# Patient Record
Sex: MALE | Race: WHITE | NOT HISPANIC OR LATINO | Employment: UNEMPLOYED | URBAN - METROPOLITAN AREA
[De-identification: names, ages, dates, MRNs, and addresses within clinical notes are randomized per-mention and may not be internally consistent; named-entity substitution may affect disease eponyms.]

---

## 2018-08-26 ENCOUNTER — APPOINTMENT (OUTPATIENT)
Dept: RADIOLOGY | Facility: MEDICAL CENTER | Age: 38
End: 2018-08-26
Attending: EMERGENCY MEDICINE
Payer: MEDICAID

## 2018-08-26 ENCOUNTER — HOSPITAL ENCOUNTER (EMERGENCY)
Facility: MEDICAL CENTER | Age: 38
End: 2018-08-26
Attending: EMERGENCY MEDICINE
Payer: MEDICAID

## 2018-08-26 VITALS
WEIGHT: 155 LBS | OXYGEN SATURATION: 96 % | RESPIRATION RATE: 15 BRPM | SYSTOLIC BLOOD PRESSURE: 102 MMHG | BODY MASS INDEX: 20.99 KG/M2 | HEART RATE: 55 BPM | HEIGHT: 72 IN | DIASTOLIC BLOOD PRESSURE: 69 MMHG | TEMPERATURE: 96.5 F

## 2018-08-26 DIAGNOSIS — S61.412A LACERATION OF LEFT HAND WITHOUT FOREIGN BODY, INITIAL ENCOUNTER: ICD-10-CM

## 2018-08-26 PROCEDURE — 700101 HCHG RX REV CODE 250

## 2018-08-26 PROCEDURE — 73130 X-RAY EXAM OF HAND: CPT | Mod: LT

## 2018-08-26 PROCEDURE — 303747 HCHG EXTRA SUTURE

## 2018-08-26 PROCEDURE — A9270 NON-COVERED ITEM OR SERVICE: HCPCS | Performed by: EMERGENCY MEDICINE

## 2018-08-26 PROCEDURE — 304217 HCHG IRRIGATION SYSTEM

## 2018-08-26 PROCEDURE — 29125 APPL SHORT ARM SPLINT STATIC: CPT

## 2018-08-26 PROCEDURE — 99284 EMERGENCY DEPT VISIT MOD MDM: CPT

## 2018-08-26 PROCEDURE — 302874 HCHG BANDAGE ACE 2 OR 3""

## 2018-08-26 PROCEDURE — 304999 HCHG REPAIR-SIMPLE/INTERMED LEVEL 1

## 2018-08-26 PROCEDURE — 700102 HCHG RX REV CODE 250 W/ 637 OVERRIDE(OP): Performed by: EMERGENCY MEDICINE

## 2018-08-26 RX ORDER — LIDOCAINE HYDROCHLORIDE AND EPINEPHRINE BITARTRATE 20; .01 MG/ML; MG/ML
10 INJECTION, SOLUTION SUBCUTANEOUS ONCE
Status: COMPLETED | OUTPATIENT
Start: 2018-08-26 | End: 2018-08-26

## 2018-08-26 RX ORDER — HYDROCODONE BITARTRATE AND ACETAMINOPHEN 5; 325 MG/1; MG/1
1 TABLET ORAL ONCE
Status: COMPLETED | OUTPATIENT
Start: 2018-08-26 | End: 2018-08-26

## 2018-08-26 RX ORDER — CEPHALEXIN 500 MG/1
500 CAPSULE ORAL 3 TIMES DAILY
Qty: 30 CAP | Refills: 0 | Status: SHIPPED | OUTPATIENT
Start: 2018-08-26 | End: 2018-09-05

## 2018-08-26 RX ORDER — HYDROCODONE BITARTRATE AND ACETAMINOPHEN 5; 325 MG/1; MG/1
1-2 TABLET ORAL EVERY 4 HOURS PRN
Qty: 8 TAB | Refills: 0 | Status: SHIPPED | OUTPATIENT
Start: 2018-08-26 | End: 2018-08-28

## 2018-08-26 RX ORDER — LIDOCAINE HYDROCHLORIDE AND EPINEPHRINE BITARTRATE 20; .01 MG/ML; MG/ML
INJECTION, SOLUTION SUBCUTANEOUS
Status: COMPLETED
Start: 2018-08-26 | End: 2018-08-26

## 2018-08-26 RX ORDER — CEPHALEXIN 500 MG/1
500 CAPSULE ORAL ONCE
Status: COMPLETED | OUTPATIENT
Start: 2018-08-26 | End: 2018-08-26

## 2018-08-26 RX ADMIN — HYDROCODONE BITARTRATE AND ACETAMINOPHEN 1 TABLET: 5; 325 TABLET ORAL at 20:04

## 2018-08-26 RX ADMIN — LIDOCAINE HYDROCHLORIDE AND EPINEPHRINE BITARTRATE 10 ML: 20; .01 INJECTION, SOLUTION SUBCUTANEOUS at 20:00

## 2018-08-26 RX ADMIN — LIDOCAINE HYDROCHLORIDE AND EPINEPHRINE 10 ML: 20; 10 INJECTION, SOLUTION INFILTRATION; PERINEURAL at 20:00

## 2018-08-26 RX ADMIN — CEPHALEXIN 500 MG: 500 CAPSULE ORAL at 21:07

## 2018-08-26 ASSESSMENT — LIFESTYLE VARIABLES
TOTAL SCORE: 0
HAVE PEOPLE ANNOYED YOU BY CRITICIZING YOUR DRINKING: NO
TOTAL SCORE: 0
TOTAL SCORE: 0
EVER FELT BAD OR GUILTY ABOUT YOUR DRINKING: NO
CONSUMPTION TOTAL: INCOMPLETE
DO YOU DRINK ALCOHOL: YES
HAVE YOU EVER FELT YOU SHOULD CUT DOWN ON YOUR DRINKING: NO
EVER HAD A DRINK FIRST THING IN THE MORNING TO STEADY YOUR NERVES TO GET RID OF A HANGOVER: NO

## 2018-08-26 ASSESSMENT — PAIN SCALES - GENERAL
PAINLEVEL_OUTOF10: 10
PAINLEVEL_OUTOF10: 5

## 2018-08-27 NOTE — ED NOTES
Discharge instructions given to patient, prescriptions provided, a verbal understanding of all instructions was stated. Pt preferred to walk out. VSS,  all belongings accounted for.

## 2018-08-27 NOTE — ED PROVIDER NOTES
ED Provider Note    Scribed for Rosa Stein M.D. by Piero Koehler. 8/26/2018, 7:46 PM.    Primary Care Provider: None noted   Means of arrival: Walk-In  History obtained from: Patient  History limited by: None     CHIEF COMPLAINT  Chief Complaint   Patient presents with   • T-5000 Lacerations     left hand with .        HPI  Narciso Wilson is a 38 y.o. male who presents to the Emergency Department for evaluation of left hand laceration that occurred just prior to arrival. Patient states he cut his left hand with a  while working on a project. Per nurse's notes, the laceration is located between his first and second left digits. Patient denies any other injuries. His last tetanus shot was approximately 14 months ago.        REVIEW OF SYSTEMS  Pertinent positives include left hand laceration and pain. Pertinent negatives include no other injuries.  E.     PAST MEDICAL HISTORY  None noted     SOCIAL HISTORY  Social History   Substance Use Topics   • Smoking status: Current Every Day Smoker     Packs/day: 0.50   • Smokeless tobacco: Never Used   • Alcohol use No      History   Drug Use No       SURGICAL HISTORY  patient denies any surgical history     CURRENT MEDICATIONS  Home Medications     Reviewed by Luis Bonds R.N. (Registered Nurse) on 08/26/18 at 1903  Med List Status: Complete   Medication Last Dose Status        Patient Femi Taking any Medications                       ALLERGIES  No Known Allergies    PHYSICAL EXAM  VITAL SIGNS: /43   Pulse 61   Temp 35.8 °C (96.5 °F)   Resp 18   Ht 1.829 m (6')   Wt 70.3 kg (155 lb)   SpO2 99%   BMI 21.02 kg/m²   Constitutional: Alert in no apparent distress. Well appearing  HENT: Normocephalic, Atraumatic, Bilateral external ears normal. Nose normal.   Eyes:  Conjunctiva normal, non-icteric.   Lungs: Non-labored respirations  Skin: Warm, Dry, No erythema, No rash.   Neurologic: Alert, Grossly non-focal.   Psychiatric: Affect  normal, Judgment normal, Mood normal, Appears appropriate and not intoxicated.   Extremities: Large 5 cm laceration over the first webspace on dorsal aspect of his left hand.       RADIOLOGY  DX-HAND 3+ LEFT   Final Result         1.  No acute traumatic bony injury.      The radiologist's interpretation of all radiological studies have been reviewed by me.    Laceration Repair Procedure Note    Indication: Laceration    Procedure: The patient was placed in the appropriate position and anesthesia around the laceration was obtained by infiltration using 2% Lidocaine with epinephrine. The area was then irrigated with sterile water. The wound was explored and no foreign body/bodies was/were found. The laceration was closed with 4-0 Prolene using interrupted sutures. There were no additional lacerations requiring repair. The wound area was then dressed with a bandage.      Total repaired wound length: 5 cm.     Other Items: Suture count: 11    The patient tolerated the procedure well.    Complications: None      COURSE & MEDICAL DECISION MAKING  Pertinent Labs & Imaging studies reviewed. (See chart for details)    7:46 PM - Patient seen and examined at bedside. Patient will receive Norco 5-325 mg 1 tablet. Ordered DX-Hand to evaluate.     7:50 PM - Cleansed laceration at this time.     8:11 PM - Paged Hand Plastic Surgery     8:16 PM - Consulted Dr. Loera, Hand Plastic Surgeon, who agrees with having the patient's laceration repaired.     8:20 PM - Reviewed patient's radiology results at this time.      8:27 PM - Laceration repair procedure performed at this time. Discussed radiology results as shown above. Informed patient of discharge and advised to return in 10 days for suture removal. He is also advised to return to ED for increased redness, swelling, and drainage or other concerns. Patient understands and is agreeable at this time.       Decision Making:  This is a 38 y.o. year old who presents with a large  laceration on his hand.  It does involve the muscle but he has intact opposition and sensation of his fingers as well as extension.  I do not think he involve any tendons.  X-rays negative for fracture.  He did speak briefly with Dr. Loera who advised closure.  This was performed.  He was given a very short prescription of pain medications but otherwise will take ibuprofen and Tylenol.  He was given Keflex as well he is agreeable to this plan his tetanus is up-to-date.    The patient will not drink alcohol nor drive with prescribed medications. The patient will return for new or worsening symptoms and is stable at the time of discharge. Patient was given return precautions. Patient and/or family member verbalizes understanding and will comply.     In prescribing controlled substances to this patient, I certify that I have obtained and reviewed the medical history of Narciso Wilson. I have also made a good carlos effort to obtain applicable records from other providers who have treated the patient and records did not demonstrate any increased risk of substance abuse that would prevent me from prescribing controlled substances.     I have conducted a physical exam and documented it. I have reviewed Mr. Wilson’s prescription history as maintained by the [place] Prescription Monitoring Program.     I have assessed the patient’s risk for abuse, dependency, and addiction using the validated Opioid Risk Tool available at https://www.mdcalc.com/xeguvj-phye-kcew-ort-narcotic-abuse.     Given the above, I believe the benefits of controlled substance therapy outweigh the risks. The reasons for prescribing controlled substances include non-narcotic, oral analgesic alternatives have been inadequate for pain control. Accordingly, I have discussed the risk and benefits, treatment plan, and alternative therapies with the patient.       DISPOSITION:  Patient will be discharged home in stable condition.    FOLLOW UP:  [facility], Emergency Dept  [address] [id number] [phone]    Return in 10 days for suture removal.  Return before then for increased redness swelling drainage or other concerns.    Eduard Lorea M.D.  [address]  [place] [facility] [address]  [phone]      as needed for hand follow up      OUTPATIENT MEDICATIONS:  Discharge Medication List as of 8/26/2018  8:56 PM      START taking these medications    Details   HYDROcodone-acetaminophen (NORCO) 5-325 MG Tab per tablet Take 1-2 Tabs by mouth every four hours as needed for up to 2 days., Disp-8 Tab, R-0, Print Rx Paper, For 2 days      cephALEXin (KEFLEX) 500 MG Cap Take 1 Cap by mouth 3 times a day for 10 days., Disp-30 Cap, R-0, Print Rx Paper             FINAL IMPRESSION  1. Laceration of left hand without foreign body, initial encounter      This dictation has been created using voice recognition software and/or scribes. The accuracy of the dictation is limited by the abilities of the software and the expertise of the scribes. I expect there may be some errors of grammar and possibly content. I made every attempt to manually correct the errors within my dictation. However, errors related to voice recognition software and/or scribes may still exist and should be interpreted within the appropriate context.     IPiero (Scribe), am scribing for, and in the presence of, Rosa Stein M.D..    Electronically signed by: Piero Koehler (Scribe), 8/26/2018    Rosa ALY M.D. personally performed the services described in this documentation, as scribed by Piero Koehler in my presence, and it is both accurate and complete.    The note accurately reflects work and decisions made by me.  Rosa Stein  8/26/2018  9:59 PM

## 2018-08-27 NOTE — ED NOTES
Dr. Stein in to numb and irrigate pt's laceration. Pt. Medicated per MAR and updated on POC for X-Ray and sutures. Call light within reach. Will continue to monitor.

## 2018-08-27 NOTE — ED TRIAGE NOTES
Chief Complaint   Patient presents with   • T-5000 Lacerations     left hand with .      To triage for above. Appears uncomfortable. CMS intact. VSS. Explained triage process, to waiting room. Asked to inform RN if questions or concerns arise.

## 2018-08-27 NOTE — ED NOTES
Pt. To [facility] [address] via wheelchair. Pt. States he cute the area between his first and second left digits using a  working on a project. Pt denies HI/SI. Pulses palpable in left arm and CMS intact. Pt. Updated on POC for ERP to see. Call light within reach. Will continue to monitor.

## 2018-08-27 NOTE — DISCHARGE INSTRUCTIONS
Laceration Care, Adult  A laceration is a cut that goes through all of the layers of the skin and into the tissue that is right under the skin. Some lacerations heal on their own. Others need to be closed with stitches (sutures), staples, skin adhesive strips, or skin glue. Proper laceration care minimizes the risk of infection and helps the laceration to heal better.  HOW TO CARE FOR YOUR LACERATION  If sutures or staples were used:  · Keep the wound clean and dry.  · If you were given a bandage (dressing), you should change it at least one time per day or as told by your health care provider. You should also change it if it becomes wet or dirty.  · Keep the wound completely dry for the first 24 hours or as told by your health care provider. After that time, you may shower or bathe. However, make sure that the wound is not soaked in water until after the sutures or staples have been removed.  · Clean the wound one time each day or as told by your health care provider:  ¨ Wash the wound with soap and water.  ¨ Rinse the wound with water to remove all soap.  ¨ Pat the wound dry with a clean towel. Do not rub the wound.  · After cleaning the wound, apply a thin layer of antibiotic ointment as told by your health care provider. This will help to prevent infection and keep the dressing from sticking to the wound.  · Have the sutures or staples removed as told by your health care provider.  If skin adhesive strips were used:  · Keep the wound clean and dry.  · If you were given a bandage (dressing), you should change it at least one time per day or as told by your health care provider. You should also change it if it becomes dirty or wet.  · Do not get the skin adhesive strips wet. You may shower or bathe, but be careful to keep the wound dry.  · If the wound gets wet, pat it dry with a clean towel. Do not rub the wound.  · Skin adhesive strips fall off on their own. You may trim the strips as the wound heals. Do not  remove skin adhesive strips that are still stuck to the wound. They will fall off in time.  If skin glue was used:  · Try to keep the wound dry, but you may briefly wet it in the shower or bath. Do not soak the wound in water, such as by swimming.  · After you have showered or bathed, gently pat the wound dry with a clean towel. Do not rub the wound.  · Do not do any activities that will make you sweat heavily until the skin glue has fallen off on its own.  · Do not apply liquid, cream, or ointment medicine to the wound while the skin glue is in place. Using those may loosen the film before the wound has healed.  · If you were given a bandage (dressing), you should change it at least one time per day or as told by your health care provider. You should also change it if it becomes dirty or wet.  · If a dressing is placed over the wound, be careful not to apply tape directly over the skin glue. Doing that may cause the glue to be pulled off before the wound has healed.  · Do not pick at the glue. The skin glue usually remains in place for 5-10 days, then it falls off of the skin.  General Instructions  · Take over-the-counter and prescription medicines only as told by your health care provider.  · If you were prescribed an antibiotic medicine or ointment, take or apply it as told by your doctor. Do not stop using it even if your condition improves.  · To help prevent scarring, make sure to cover your wound with sunscreen whenever you are outside after stitches are removed, after adhesive strips are removed, or when glue remains in place and the wound is healed. Make sure to wear a sunscreen of at least 30 SPF.  · Do not scratch or pick at the wound.  · Keep all follow-up visits as told by your health care provider. This is important.  · Check your wound every day for signs of infection. Watch for:  ¨ Redness, swelling, or pain.  ¨ Fluid, blood, or pus.  · Raise (elevate) the injured area above the level of your heart  while you are sitting or lying down, if possible.  SEEK MEDICAL CARE IF:  · You received a tetanus shot and you have swelling, severe pain, redness, or bleeding at the injection site.  · You have a fever.  · A wound that was closed breaks open.  · You notice a bad smell coming from your wound or your dressing.  · You notice something coming out of the wound, such as wood or glass.  · Your pain is not controlled with medicine.  · You have increased redness, swelling, or pain at the site of your wound.  · You have fluid, blood, or pus coming from your wound.  · You notice a change in the color of your skin near your wound.  · You need to change the dressing frequently due to fluid, blood, or pus draining from the wound.  · You develop a new rash.  · You develop numbness around the wound.  SEEK IMMEDIATE MEDICAL CARE IF:  · You develop severe swelling around the wound.  · Your pain suddenly increases and is severe.  · You develop painful lumps near the wound or on skin that is anywhere on your body.  · You have a red streak going away from your wound.  · The wound is on your hand or foot and you cannot properly move a finger or toe.  · The wound is on your hand or foot and you notice that your fingers or toes look pale or bluish.     This information is not intended to replace advice given to you by your health care provider. Make sure you discuss any questions you have with your health care provider.     Document Released: 12/18/2006 Document Revised: 05/03/2016 Document Reviewed: 12/14/2015  [employer] Interactive Patient Education ©2016 [employer]

## 2018-11-09 ENCOUNTER — HOSPITAL ENCOUNTER (EMERGENCY)
Facility: MEDICAL CENTER | Age: 38
End: 2018-11-09
Attending: EMERGENCY MEDICINE
Payer: MEDICAID

## 2018-11-09 VITALS
OXYGEN SATURATION: 97 % | BODY MASS INDEX: 20.87 KG/M2 | WEIGHT: 154.1 LBS | HEIGHT: 72 IN | SYSTOLIC BLOOD PRESSURE: 110 MMHG | TEMPERATURE: 97.7 F | DIASTOLIC BLOOD PRESSURE: 65 MMHG | RESPIRATION RATE: 16 BRPM | HEART RATE: 82 BPM

## 2018-11-09 DIAGNOSIS — L08.9 WOUND INFECTION: ICD-10-CM

## 2018-11-09 DIAGNOSIS — J06.9 VIRAL URI: ICD-10-CM

## 2018-11-09 DIAGNOSIS — T14.8XXA WOUND INFECTION: ICD-10-CM

## 2018-11-09 PROCEDURE — 99283 EMERGENCY DEPT VISIT LOW MDM: CPT

## 2018-11-09 RX ORDER — CEPHALEXIN 500 MG/1
1000 CAPSULE ORAL 3 TIMES DAILY
Qty: 42 CAP | Refills: 0 | Status: SHIPPED | OUTPATIENT
Start: 2018-11-09 | End: 2018-11-16

## 2018-11-09 NOTE — ED NOTES
Pt ambulate to room, agree with triage notes. Pt states on and off sore throat with nasal discharge and general tiredness today. Also C/O hand pain from previous injury.

## 2018-11-09 NOTE — ED NOTES
Discharge instructions given to patient, prescriptions provided, a verbal understanding of all instructions was stated. Pt preferred to walk out accompanied by self VSS,  all belongings accounted for.

## 2018-11-09 NOTE — ED TRIAGE NOTES
Narciso Wilson  Chief Complaint   Patient presents with   • Hand Pain     Pt complain of pain in his left hand. Pt cut himself 2-3 months ago with a box knife. Pt now complains of extreme pain with cold temperature, that is worse tonight. CMS intact.    • Cold Symptoms     x 3 days. Pt reports taking OTC medications for symptom relief.      Pt ambulated to triage with above complaint.     /65   Pulse 82   Temp 36.5 °C (97.7 °F)   Resp 16   Ht 1.829 m (6')   Wt 69.9 kg (154 lb 1.6 oz)   SpO2 97%   BMI 20.90 kg/m²     Pt informed of triage process and encouraged to notify staff of any changes or concerns. Pt verbalized understanding of instructions. Apologized for long wait time. Pt placed back in lobby.

## 2018-11-09 NOTE — DISCHARGE INSTRUCTIONS
You were seen in the Emergency Department for hand pain and cold symptoms.    Please use 1,000mg of tylenol or 600mg of ibuprofen every 6 hours as needed for pain.  Take antibiotics as directed for possible wound infection.  Please ensure you are drinking plenty of fluids.    Please follow up with your primary care physician.    Return to the Emergency Department with fevers, worsening pain, redness or swelling surrounding the wound, or other concerns.

## 2018-11-09 NOTE — ED PROVIDER NOTES
ED Provider Note    Scribed for Nivia Lane M.D. by Mindi García. 11/9/2018  1:25 AM    Means of arrival: Walk in  History obtained from: Patient  History limited by: None      CHIEF COMPLAINT  Chief Complaint   Patient presents with   • Hand Pain     Pt complain of pain in his left hand. Pt cut himself 2-3 months ago with a box knife. Pt now complains of extreme pain with cold temperature, that is worse tonight. CMS intact.    • Cold Symptoms     x 3 days. Pt reports taking OTC medications for symptom relief.        HPI  Narciso Wilson is a 38 y.o. male who presents to the Emergency Department for evaluation of left hand pain. He states he initially cut the webspace between his left thumb and left pointer finger about a month ago, but has since noticed decreased sensation around his injury site. The patient additionally endorses redness around his prior laceration site that began 1 week ago. He states he was never placed on antibiotics following the incident. The patient denies associated weakness.     The patient additionally endorses cold like symptoms that include tactile fever, sinus congestion, generalized fatigue, and headaches. He also reports a non productive cough, but states he is in the process of quitting smoking. The patient denies nausea or vomiting. No alleviating or exacerbating factors are identified at this time.     REVIEW OF SYSTEMS  Pertinent positive include decreased sensation to left hand, left hand pain, redness of left hand, tactile fever, sinus congestion, generalized fatigue, headache, and cough. Pertinent negative include nausea, vomiting, or weakness.     PAST MEDICAL HISTORY   Left Hand Laceration    SOCIAL HISTORY  Social History     Social History Main Topics   • Smoking status: Current Every Day Smoker     Packs/day: 0.50   • Smokeless tobacco: Never Used   • Alcohol use No   • Drug use: No       SURGICAL HISTORY  patient denies any surgical history    CURRENT  MEDICATIONS  Home Medications    **Home medications have not yet been reviewed for this encounter**         ALLERGIES  No Known Allergies    PHYSICAL EXAM   VITAL SIGNS: /65   Pulse 82   Temp 36.5 °C (97.7 °F)   Resp 16   Ht 1.829 m (6')   Wt 69.9 kg (154 lb 1.6 oz)   SpO2 97%   BMI 20.90 kg/m²    Constitutional: Non-toxic appearing male. Alert in no apparent distress.  HENT: Normocephalic, Atraumatic. Bilateral external ears normal. Nose normal. Moist mucous membranes.  Neck: Supple, full range of motion.  Eyes: Pupils are equal and reactive. Conjunctiva normal.   Heart: Regular rate and rhythm. No murmurs.    Lungs: No respiratory distress.  Normal work of breathing.  Clear to auscultation bilaterally.  Abdomen:  Soft, no distention. No tenderness to palpation  Skin: Warm, Dry. No rash. Left hand with 3-4 cm healing laceration at the base of the thumb with surrounding erythema.   Musculoskeletal: Atraumatic, no deformities noted. Intact distal pulses.  Neurologic: Alert and oriented. Moving all extremities spontaneously. Decreased sensation distally to healing left hand laceration. Sensation intact throughout radial, medial, and ulnar distributions.    Psychiatric: Affect normal, Mood normal. Appears appropriate and not intoxicated.       ED COURSE  Vitals:    11/09/18 0110 11/09/18 0114   BP: 110/65    Pulse: 82    Resp: 16    Temp: 36.5 °C (97.7 °F)    SpO2: 97%    Weight:  69.9 kg (154 lb 1.6 oz)   Height: 1.829 m (6')          Medications administered:  Medications - No data to display    MEDICAL DECISION MAKING  1:25 AM Patient seen and examined at bedside. The patient presents with left hand pain and URI symptoms. Vital signs and physical exam are reassuring. Patient's lungs are clear with no signs of pneumonia and throat is clear with no signs of strep pharyngitis. He was made aware his cold like symptoms are likely due to viral illness. Patient additionally has sustained superficial nerve  damage from recent laceration.  There is no evidence of weakness or concern for tendon damage.  He may be developing minor wound infection therefore he will be given Keflex prescription for surrounding erythema around healing laceration. Patient instructed to return for worsening symptoms. He is understanding and agreeable to discharge.     The patient will return for new or worsening symptoms and is stable at the time of discharge.      DISPOSITION:  Patient will be discharged home in stable condition.    FOLLOW UP:  [facility]  [address] [address] [phone]  Call   to establish PCP    [facility], Emergency Dept  [address] [address] [phone]    If symptoms worsen      OUTPATIENT MEDICATIONS:  Discharge Medication List as of 11/9/2018  1:36 AM      START taking these medications    Details   cephALEXin (KEFLEX) 500 MG Cap Take 2 Caps by mouth 3 times a day for 7 days., Disp-42 Cap, R-0, Print Rx Paper             IMPRESSION  (T14.8XXA,  L08.9) Wound infection  (J06.9) Viral URI    Results, diagnoses, and treatment options were discussed with the patient and/or family. Patient verbalized understanding of plan of care.       Mindi ALY (Tea), am scribing for, and in the presence of, Nivia Lane M.D..    Electronically signed by: Minid García (Tea), 11/9/2018    Nivia ALY M.D. personally performed the services described in this documentation, as scribed by Mindi García in my presence, and it is both accurate and complete.    E.    The note accurately reflects work and decisions made by me.  Nivia Lane  11/9/2018  8:20 AM

## 2020-01-23 ENCOUNTER — HOSPITAL ENCOUNTER (EMERGENCY)
Facility: MEDICAL CENTER | Age: 40
End: 2020-01-23
Attending: EMERGENCY MEDICINE
Payer: MEDICAID

## 2020-01-23 VITALS
TEMPERATURE: 97.5 F | WEIGHT: 171.3 LBS | HEART RATE: 76 BPM | OXYGEN SATURATION: 98 % | DIASTOLIC BLOOD PRESSURE: 71 MMHG | HEIGHT: 72 IN | RESPIRATION RATE: 17 BRPM | SYSTOLIC BLOOD PRESSURE: 105 MMHG | BODY MASS INDEX: 23.2 KG/M2

## 2020-01-23 DIAGNOSIS — L02.91 ABSCESS: ICD-10-CM

## 2020-01-23 LAB
ALBUMIN SERPL BCP-MCNC: 3.8 G/DL (ref 3.2–4.9)
ALBUMIN/GLOB SERPL: 1 G/DL
ALP SERPL-CCNC: 73 U/L (ref 30–99)
ALT SERPL-CCNC: 27 U/L (ref 2–50)
AMPHET UR QL SCN: POSITIVE
ANION GAP SERPL CALC-SCNC: 7 MMOL/L (ref 0–11.9)
AST SERPL-CCNC: 26 U/L (ref 12–45)
BARBITURATES UR QL SCN: NEGATIVE
BASOPHILS # BLD AUTO: 0.7 % (ref 0–1.8)
BASOPHILS # BLD: 0.05 K/UL (ref 0–0.12)
BENZODIAZ UR QL SCN: NEGATIVE
BILIRUB SERPL-MCNC: 0.5 MG/DL (ref 0.1–1.5)
BUN SERPL-MCNC: 17 MG/DL (ref 8–22)
BZE UR QL SCN: NEGATIVE
CALCIUM SERPL-MCNC: 9.3 MG/DL (ref 8.5–10.5)
CANNABINOIDS UR QL SCN: POSITIVE
CHLORIDE SERPL-SCNC: 103 MMOL/L (ref 96–112)
CO2 SERPL-SCNC: 27 MMOL/L (ref 20–33)
CREAT SERPL-MCNC: 0.83 MG/DL (ref 0.5–1.4)
CRP SERPL HS-MCNC: 34.3 MG/L (ref 0–7.5)
EOSINOPHIL # BLD AUTO: 0.15 K/UL (ref 0–0.51)
EOSINOPHIL NFR BLD: 2 % (ref 0–6.9)
ERYTHROCYTE [DISTWIDTH] IN BLOOD BY AUTOMATED COUNT: 42 FL (ref 35.9–50)
GLOBULIN SER CALC-MCNC: 3.9 G/DL (ref 1.9–3.5)
GLUCOSE SERPL-MCNC: 97 MG/DL (ref 65–99)
GRAM STN SPEC: NORMAL
HCT VFR BLD AUTO: 46.2 % (ref 42–52)
HGB BLD-MCNC: 14.8 G/DL (ref 14–18)
IMM GRANULOCYTES # BLD AUTO: 0.06 K/UL (ref 0–0.11)
IMM GRANULOCYTES NFR BLD AUTO: 0.8 % (ref 0–0.9)
LYMPHOCYTES # BLD AUTO: 0.8 K/UL (ref 1–4.8)
LYMPHOCYTES NFR BLD: 10.5 % (ref 22–41)
MCH RBC QN AUTO: 27.2 PG (ref 27–33)
MCHC RBC AUTO-ENTMCNC: 32 G/DL (ref 33.7–35.3)
MCV RBC AUTO: 84.9 FL (ref 81.4–97.8)
METHADONE UR QL SCN: NEGATIVE
MONOCYTES # BLD AUTO: 0.55 K/UL (ref 0–0.85)
MONOCYTES NFR BLD AUTO: 7.2 % (ref 0–13.4)
NEUTROPHILS # BLD AUTO: 6.03 K/UL (ref 1.82–7.42)
NEUTROPHILS NFR BLD: 78.8 % (ref 44–72)
NRBC # BLD AUTO: 0 K/UL
NRBC BLD-RTO: 0 /100 WBC
OPIATES UR QL SCN: NEGATIVE
OXYCODONE UR QL SCN: NEGATIVE
PCP UR QL SCN: NEGATIVE
PLATELET # BLD AUTO: 233 K/UL (ref 164–446)
PMV BLD AUTO: 9.9 FL (ref 9–12.9)
POTASSIUM SERPL-SCNC: 4 MMOL/L (ref 3.6–5.5)
PROPOXYPH UR QL SCN: NEGATIVE
PROT SERPL-MCNC: 7.7 G/DL (ref 6–8.2)
RBC # BLD AUTO: 5.44 M/UL (ref 4.7–6.1)
SIGNIFICANT IND 70042: NORMAL
SITE SITE: NORMAL
SODIUM SERPL-SCNC: 137 MMOL/L (ref 135–145)
SOURCE SOURCE: NORMAL
WBC # BLD AUTO: 7.6 K/UL (ref 4.8–10.8)

## 2020-01-23 PROCEDURE — 303977 HCHG I & D

## 2020-01-23 PROCEDURE — 80307 DRUG TEST PRSMV CHEM ANLYZR: CPT

## 2020-01-23 PROCEDURE — 10160 PNXR ASPIR ABSC HMTMA BULLA: CPT

## 2020-01-23 PROCEDURE — 87070 CULTURE OTHR SPECIMN AEROBIC: CPT | Mod: XU

## 2020-01-23 PROCEDURE — 80053 COMPREHEN METABOLIC PANEL: CPT

## 2020-01-23 PROCEDURE — 96365 THER/PROPH/DIAG IV INF INIT: CPT | Mod: XU

## 2020-01-23 PROCEDURE — 86141 C-REACTIVE PROTEIN HS: CPT

## 2020-01-23 PROCEDURE — 99283 EMERGENCY DEPT VISIT LOW MDM: CPT

## 2020-01-23 PROCEDURE — 303485 HCHG DRESSING MEDIUM

## 2020-01-23 PROCEDURE — 87147 CULTURE TYPE IMMUNOLOGIC: CPT

## 2020-01-23 PROCEDURE — 85025 COMPLETE CBC W/AUTO DIFF WBC: CPT

## 2020-01-23 PROCEDURE — 87040 BLOOD CULTURE FOR BACTERIA: CPT

## 2020-01-23 PROCEDURE — 87205 SMEAR GRAM STAIN: CPT

## 2020-01-23 PROCEDURE — 99284 EMERGENCY DEPT VISIT MOD MDM: CPT

## 2020-01-23 PROCEDURE — 87186 SC STD MICRODIL/AGAR DIL: CPT

## 2020-01-23 PROCEDURE — 700101 HCHG RX REV CODE 250: Performed by: EMERGENCY MEDICINE

## 2020-01-23 PROCEDURE — 87077 CULTURE AEROBIC IDENTIFY: CPT

## 2020-01-23 RX ORDER — CLINDAMYCIN HYDROCHLORIDE 150 MG/1
300 CAPSULE ORAL 4 TIMES DAILY
Qty: 56 CAP | Refills: 0 | Status: SHIPPED | OUTPATIENT
Start: 2020-01-23 | End: 2020-01-30

## 2020-01-23 RX ORDER — LIDOCAINE HYDROCHLORIDE AND EPINEPHRINE BITARTRATE 20; .01 MG/ML; MG/ML
10 INJECTION, SOLUTION SUBCUTANEOUS ONCE
Status: COMPLETED | OUTPATIENT
Start: 2020-01-23 | End: 2020-01-23

## 2020-01-23 RX ORDER — CLINDAMYCIN PHOSPHATE 600 MG/50ML
600 INJECTION, SOLUTION INTRAVENOUS ONCE
Status: COMPLETED | OUTPATIENT
Start: 2020-01-23 | End: 2020-01-23

## 2020-01-23 RX ADMIN — CLINDAMYCIN IN 5 PERCENT DEXTROSE 600 MG: 12 INJECTION, SOLUTION INTRAVENOUS at 12:05

## 2020-01-23 RX ADMIN — LIDOCAINE HYDROCHLORIDE AND EPINEPHRINE 10 ML: 20; 10 INJECTION, SOLUTION INFILTRATION; PERINEURAL at 11:45

## 2020-01-23 NOTE — ED TRIAGE NOTES
Pt believes he has a spider bite to his right forearm, denies seeing a spider. Large redness and abscess like area noted.

## 2020-01-23 NOTE — ED NOTES
Patient states he is unable to provide urine sample, urinal placed at bedside and encouraged to void.
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
## 2020-01-25 LAB
BACTERIA WND AEROBE CULT: ABNORMAL
BACTERIA WND AEROBE CULT: ABNORMAL
GRAM STN SPEC: ABNORMAL
SIGNIFICANT IND 70042: ABNORMAL
SITE SITE: ABNORMAL
SOURCE SOURCE: ABNORMAL

## 2020-01-27 NOTE — ED NOTES
ED Positive Culture Follow-up/Notification Note:    Date: 1/26/20     Patient seen in the ED on 1/23/2020 for right forearm swelling x 2 days. The pt reported that he has felt ill and may have been having fevers. The pt denied any chills, vomiting, or IVDU, however his UA was positive for amphetamines.     1. Abscess       Discharge Medication List as of 1/23/2020  2:07 PM      START taking these medications    Details   clindamycin (CLEOCIN) 150 MG Cap Take 2 Caps by mouth 4 times a day for 7 days., Disp-56 Cap, R-0, Print Rx Paper           Medications given in the ED:  -Clindamycin 600mg IV once    Allergies: Patient has no known allergies.     Vitals:    01/23/20 1059 01/23/20 1100 01/23/20 1418   BP: 115/70  105/71   Pulse: 90  76   Resp: 18  17   Temp: 36.4 °C (97.5 °F)     TempSrc: Temporal     SpO2: 99%  98%   Weight:  77.7 kg (171 lb 4.8 oz)    Height: 1.829 m (6')         Final cultures:   Results     Procedure Component Value Units Date/Time    CULTURE WOUND W/ GRAM STAIN [[id number]]  (Abnormal)  (Susceptibility) Collected:  01/23/20 1328    Order Status:  Completed Specimen:  Wound from Abscess Updated:  01/25/20 0818     Significant Indicator POS     Source WND     Site Right Arm     Culture Result -     Gram Stain Result Few WBCs.  Few Gram positive cocci.       Culture Result Staphylococcus aureus  Heavy growth      Susceptibility     Staphylococcus aureus (1)     Antibiotic Interpretation Microscan Method Status    Azithromycin Sensitive <=2 mcg/mL BROOKE Final    Clindamycin Sensitive <=0.5 mcg/mL BROOKE Final    Cefazolin Sensitive <=8 mcg/mL BROOKE Final    Ceftaroline Sensitive <=0.5 mcg/mL BROOKE Final    Daptomycin Sensitive <=1 mcg/mL BROOKE Final    Ampicillin/sulbactam Sensitive <=8/4 mcg/mL BROOKE Final    Erythromycin Sensitive <=0.25 mcg/mL BROOKE Final    Vancomycin Sensitive 1 mcg/mL BROOKE Final    Oxacillin Sensitive <=0.25 mcg/mL BROOKE Final    Pip/Tazobactam Sensitive <=4 mcg/mL BROOKE Final    Trimeth/Sulfa  "Sensitive <=0.5/9.5 mcg/mL BROOKE Final    Tetracycline Sensitive <=4 mcg/mL BROOKE Final                   Blood Culture [[id number]] Collected:  01/23/20 1130    Order Status:  Completed Specimen:  Blood from Peripheral Updated:  01/24/20 0924     Significant Indicator NEG     Source BLD     Site PERIPHERAL     Culture Result No Growth  Note: Blood cultures are incubated for 5 days and  are monitored continuously.Positive blood cultures  are called to the RN and reported as soon as  they are identified.      Narrative:       Per Hospital Policy: Only change Specimen Src: to \"Line\" if  specified by physician order.  No site indicated    GRAM STAIN [[id number]] Collected:  01/23/20 1328    Order Status:  Completed Specimen:  Wound Updated:  01/23/20 2124     Significant Indicator .     Source WND     Site Right Arm     Gram Stain Result Few WBCs.  Few Gram positive cocci.            Plan:   Appropriate antibiotic therapy prescribed. No changes required based upon culture result.    The pt underwent I&D while in the ED. Per ERP's note, pt was counseled to return if culture was positive, and to return in 2 days for a wound check and packing change. No record of pt returning in EMR. I attempted to call the pt, inform him of results, and encourage him to return to the ED as instructed by ERP at ED visit. No answer. I left a generic VM asking the pt to call back the ED culture line at [phone].     If the pt calls back, I recommend encouraging compliance with clindamycin and to return to the ED for wound check and repacking, especially if he reports that his symptoms/wound have worsened, or if he has been experiencing fevers.       Sanket Aragon, PharmD    "

## 2020-01-28 LAB
BACTERIA BLD CULT: NORMAL
SIGNIFICANT IND 70042: NORMAL
SITE SITE: NORMAL
SOURCE SOURCE: NORMAL

## 2020-02-13 ENCOUNTER — HOSPITAL ENCOUNTER (EMERGENCY)
Facility: MEDICAL CENTER | Age: 40
End: 2020-02-13
Attending: EMERGENCY MEDICINE
Payer: MEDICAID

## 2020-02-13 VITALS
DIASTOLIC BLOOD PRESSURE: 70 MMHG | BODY MASS INDEX: 22.9 KG/M2 | TEMPERATURE: 97.9 F | HEART RATE: 94 BPM | HEIGHT: 72 IN | RESPIRATION RATE: 16 BRPM | WEIGHT: 169.09 LBS | SYSTOLIC BLOOD PRESSURE: 119 MMHG | OXYGEN SATURATION: 94 %

## 2020-02-13 DIAGNOSIS — Z72.0 TOBACCO ABUSE: ICD-10-CM

## 2020-02-13 DIAGNOSIS — L02.91 ABSCESS: ICD-10-CM

## 2020-02-13 PROCEDURE — 304217 HCHG IRRIGATION SYSTEM

## 2020-02-13 PROCEDURE — 99283 EMERGENCY DEPT VISIT LOW MDM: CPT

## 2020-02-13 PROCEDURE — 99282 EMERGENCY DEPT VISIT SF MDM: CPT

## 2020-02-13 PROCEDURE — 700101 HCHG RX REV CODE 250: Performed by: EMERGENCY MEDICINE

## 2020-02-13 PROCEDURE — 303977 HCHG I & D

## 2020-02-13 RX ORDER — LIDOCAINE HYDROCHLORIDE AND EPINEPHRINE 10; 10 MG/ML; UG/ML
5 INJECTION, SOLUTION INFILTRATION; PERINEURAL ONCE
Status: COMPLETED | OUTPATIENT
Start: 2020-02-13 | End: 2020-02-13

## 2020-02-13 RX ORDER — SULFAMETHOXAZOLE AND TRIMETHOPRIM 800; 160 MG/1; MG/1
1 TABLET ORAL 2 TIMES DAILY
Qty: 14 TAB | Refills: 0 | Status: SHIPPED | OUTPATIENT
Start: 2020-02-13 | End: 2020-02-20

## 2020-02-13 RX ADMIN — LIDOCAINE HYDROCHLORIDE,EPINEPHRINE BITARTRATE 5 ML: 10; .01 INJECTION, SOLUTION INFILTRATION; PERINEURAL at 09:15

## 2020-02-13 NOTE — ED PROVIDER NOTES
ED Provider Note    ER PROVIDER NOTE      CHIEF COMPLAINT  Chief Complaint   Patient presents with   • Wound Infection     R forearm wound, 2 weeks       HPI  Narciso Wilson is a 39 y.o. male who presents to the emergency department complaining of wound to his right forearm.  Has been present over the last 2 weeks, did require drainage prior, although seems to have now returned.  He denies any injury to the area, no injection use.  He also reports that he has multiple similar smaller lesions across his abdomen.  No fevers or chills, no nausea vomiting or abdominal pain.    No history of immunocompromise.  Tetanus is up-to-date    Patient does reports that he uses a razor to shave through his abdomen has been using the same on over the areas for the last few weeks    REVIEW OF SYSTEMS  Pertinent positives include abscess. Pertinent negatives include no fever. See HPI for details. All other systems reviewed and are negative.    PAST MEDICAL HISTORY   none    SURGICAL HISTORY  patient denies any surgical history    FAMILY HISTORY  No family history on file.    SOCIAL HISTORY  Social History     Socioeconomic History   • Marital status: Single     Spouse name: Not on file   • Number of children: Not on file   • Years of education: Not on file   • Highest education level: Not on file   Occupational History   • Not on file   Social Needs   • Financial resource strain: Not on file   • Food insecurity     Worry: Not on file     Inability: Not on file   • Transportation needs     Medical: Not on file     Non-medical: Not on file   Tobacco Use   • Smoking status: Current Every Day Smoker     Packs/day: 0.50   • Smokeless tobacco: Never Used   Substance and Sexual Activity   • Alcohol use: No   • Drug use: No   • Sexual activity: Not on file   Lifestyle   • Physical activity     Days per week: Not on file     Minutes per session: Not on file   • Stress: Not on file   Relationships   • Social connections     Talks on phone:  Not on file     Gets together: Not on file     Attends [employer] service: Not on file     Active member of club or organization: Not on file     Attends meetings of clubs or organizations: Not on file     Relationship status: Not on file   • Intimate partner violence     Fear of current or ex partner: Not on file     Emotionally abused: Not on file     Physically abused: Not on file     Forced sexual activity: Not on file   Other Topics Concern   • Not on file   Social History Narrative   • Not on file      Social History     Substance and Sexual Activity   Drug Use No       CURRENT MEDICATIONS  Home Medications    **Home medications have not yet been reviewed for this encounter**         ALLERGIES  No Known Allergies    PHYSICAL EXAM  VITAL SIGNS: /70   Pulse 94   Temp 36.6 °C (97.9 °F) (Temporal)   Resp 16   Ht 1.829 m (6')   Wt 76.7 kg (169 lb 1.5 oz)   SpO2 94%   BMI 22.93 kg/m²   Pulse ox interpretation: I interpret this pulse ox as normal.    Constitutional: Alert in no apparent distress.  HENT: No signs of trauma, Bilateral external ears normal, Nose normal.   Eyes: Pupils are equal and reactive, Conjunctiva normal, Non-icteric.   Neck: Normal range of motion, No tenderness, Supple, No stridor.   Lymphatic: No lymphadenopathy noted.   Cardiovascular: Regular rate and rhythm, no murmurs.   Thorax & Lungs: Normal breath sounds, No respiratory distress, No wheezing, No chest tenderness.   Abdomen: Bowel sounds normal, Soft, No tenderness, No masses, No pulsatile masses. No peritoneal signs.  Skin: Warm, Dry, 3 cm area of erythema over the right forearm with some associated fluctuance, no proximal streaking, no significant induration, there is one scabbed over lesion within this lesion, there is several small inflamed follicles over his abdomen, no fluctuance or induration noted over these  Back: No bony tenderness, No CVA tenderness.   Extremities: Intact distal pulses, No edema, No tenderness, No  cyanosis.  Musculoskeletal: good range of motion in all major joints. No tenderness to palpation or major deformities noted.   Neurologic: Alert , Normal motor function, Normal sensory function, No focal deficits noted.   Psychiatric: Affect normal, Judgment normal, Mood normal.     DIAGNOSTIC STUDIES / PROCEDURES    RADIOLOGY  No orders to display     The radiologist's interpretation of all radiological studies have been reviewed by me.    COURSE & MEDICAL DECISION MAKING  Nursing notes, VS, PMSFHx reviewed in chart.    8:39 AM Patient seen and examined at bedside. Patient will be treated with lidocaine with epinephrine.     I reviewed patient records, he was seen approximately 3 weeks ago for similar, unremarkable CBC and CMP    INCISION AND DRAINAGE PROCEDURE NOTE:  Patient identification was confirmed and consent was obtained.    Site: Right forearm  Sterile procedures observed  Needle size: 27  Anesthetic used (type and amt): 2 cc lidocaine with epinephrine  Blade size: 10  Drainage: 2 cc purulent  Packing used  Site anesthetized, incision made over site, wound drained and explored loculations, rinsed with copious amounts of normal saline, wound packed with sterile gauze, covered with dry, sterile dressing. Pt tolerated procedure well without complications. Instructions for care discussed verbally and pt provided with additional written instructions for homecare and f/u.        Decision Making:  This is a 39 y.o. male present with abscess to his forearm.  This was drained as above.  I will start him on Bactrim given the purulent nature of this, additionally he does have multiple smaller lesions across his abdomen consistent with MRSA infection, this is likely due to his use of razor blade to shave that area.  I discussed with him appropriate wound care, as well as to not shave the area and to throw away the razor he was using.  He will return for wound check and packing removal in 2 days.  Patient has no fevers  or other findings suggestive of sepsis or more systemic involvement.  No history of immunocompromise, normal blood sugar prior visit, denies any injection drug use     The patient will return for new or worsening symptoms and is stable at the time of discharge.    The patient is referred to a primary physician for blood pressure management, diabetic screening, and for all other preventative health concerns.      DISPOSITION:  Patient will be discharged home in stable condition.    FOLLOW UP:  [facility], Emergency Dept  [address] [facility] [id number] [phone]  In 2 days  For wound re-check and packing removal      OUTPATIENT MEDICATIONS:  Discharge Medication List as of 2/13/2020  9:13 AM      START taking these medications    Details   sulfamethoxazole-trimethoprim (BACTRIM DS) 800-160 MG tablet Take 1 Tab by mouth 2 times a day for 7 days., Disp-14 Tab, R-0, Print Rx Paper               FINAL IMPRESSION  1. Abscess    2. Tobacco abuse         The note accurately reflects work and decisions made by me.  Usama Lin M.D.  2/13/2020  9:19 AM

## 2020-02-13 NOTE — ED NOTES
patient verbalized understanding of discharge instructions and the importance of taking the antibiotic as prescribed by physcian

## 2020-11-20 ENCOUNTER — HOSPITAL ENCOUNTER (EMERGENCY)
Facility: MEDICAL CENTER | Age: 40
End: 2020-11-20
Attending: EMERGENCY MEDICINE
Payer: MEDICAID

## 2020-11-20 VITALS
OXYGEN SATURATION: 96 % | RESPIRATION RATE: 12 BRPM | TEMPERATURE: 99.1 F | HEIGHT: 73 IN | HEART RATE: 93 BPM | SYSTOLIC BLOOD PRESSURE: 111 MMHG | DIASTOLIC BLOOD PRESSURE: 66 MMHG | BODY MASS INDEX: 21.88 KG/M2 | WEIGHT: 165.12 LBS

## 2020-11-20 DIAGNOSIS — N39.0 ACUTE UTI: ICD-10-CM

## 2020-11-20 DIAGNOSIS — N34.2 URETHRITIS: ICD-10-CM

## 2020-11-20 LAB
APPEARANCE UR: ABNORMAL
BACTERIA #/AREA URNS HPF: NEGATIVE /HPF
BILIRUB UR QL STRIP.AUTO: NEGATIVE
COLOR UR: YELLOW
EPI CELLS #/AREA URNS HPF: NEGATIVE /HPF
GLUCOSE UR STRIP.AUTO-MCNC: NEGATIVE MG/DL
HYALINE CASTS #/AREA URNS LPF: ABNORMAL /LPF
KETONES UR STRIP.AUTO-MCNC: NEGATIVE MG/DL
LEUKOCYTE ESTERASE UR QL STRIP.AUTO: ABNORMAL
MICRO URNS: ABNORMAL
NITRITE UR QL STRIP.AUTO: NEGATIVE
PH UR STRIP.AUTO: 7.5 [PH] (ref 5–8)
PROT UR QL STRIP: 100 MG/DL
RBC # URNS HPF: ABNORMAL /HPF
RBC UR QL AUTO: ABNORMAL
SP GR UR STRIP.AUTO: 1.02
UROBILINOGEN UR STRIP.AUTO-MCNC: 1 MG/DL
WBC #/AREA URNS HPF: ABNORMAL /HPF

## 2020-11-20 PROCEDURE — 700102 HCHG RX REV CODE 250 W/ 637 OVERRIDE(OP): Performed by: EMERGENCY MEDICINE

## 2020-11-20 PROCEDURE — 96372 THER/PROPH/DIAG INJ SC/IM: CPT

## 2020-11-20 PROCEDURE — 99284 EMERGENCY DEPT VISIT MOD MDM: CPT

## 2020-11-20 PROCEDURE — 87591 N.GONORRHOEAE DNA AMP PROB: CPT

## 2020-11-20 PROCEDURE — A9270 NON-COVERED ITEM OR SERVICE: HCPCS | Performed by: EMERGENCY MEDICINE

## 2020-11-20 PROCEDURE — 81001 URINALYSIS AUTO W/SCOPE: CPT

## 2020-11-20 PROCEDURE — 700101 HCHG RX REV CODE 250: Performed by: EMERGENCY MEDICINE

## 2020-11-20 PROCEDURE — 87491 CHLMYD TRACH DNA AMP PROBE: CPT

## 2020-11-20 PROCEDURE — 700111 HCHG RX REV CODE 636 W/ 250 OVERRIDE (IP): Performed by: EMERGENCY MEDICINE

## 2020-11-20 PROCEDURE — 87086 URINE CULTURE/COLONY COUNT: CPT

## 2020-11-20 RX ORDER — SULFAMETHOXAZOLE AND TRIMETHOPRIM 800; 160 MG/1; MG/1
1 TABLET ORAL 2 TIMES DAILY
Qty: 56 TAB | Refills: 0 | Status: SHIPPED | OUTPATIENT
Start: 2020-11-20 | End: 2020-12-18

## 2020-11-20 RX ORDER — AZITHROMYCIN 250 MG/1
1000 TABLET, FILM COATED ORAL ONCE
Status: COMPLETED | OUTPATIENT
Start: 2020-11-20 | End: 2020-11-20

## 2020-11-20 RX ORDER — CEFTRIAXONE SODIUM 250 MG/1
250 INJECTION, POWDER, FOR SOLUTION INTRAMUSCULAR; INTRAVENOUS ONCE
Status: COMPLETED | OUTPATIENT
Start: 2020-11-20 | End: 2020-11-20

## 2020-11-20 RX ADMIN — LIDOCAINE HYDROCHLORIDE 0.9 ML: 10 INJECTION, SOLUTION INFILTRATION; PERINEURAL at 17:42

## 2020-11-20 RX ADMIN — AZITHROMYCIN MONOHYDRATE 1000 MG: 250 TABLET ORAL at 17:42

## 2020-11-20 RX ADMIN — CEFTRIAXONE SODIUM 250 MG: 250 INJECTION, POWDER, FOR SOLUTION INTRAMUSCULAR; INTRAVENOUS at 17:42

## 2020-11-20 ASSESSMENT — LIFESTYLE VARIABLES: DO YOU DRINK ALCOHOL: NO

## 2020-11-20 ASSESSMENT — FIBROSIS 4 INDEX: FIB4 SCORE: 0.86

## 2020-11-20 NOTE — LETTER
11/24/2020               Narciso Wilson  [address]  [facility] [address]        Dear Narciso (MR#[id number])    As we have been unable to contact you by phone, this letter is sent in regards to your, recent visit to the [facility] Emergency Department on 11/20/2020.  During the visit, tests were performed to assist the physician in a medical diagnosis.  A review of those tests requires that we notify you of the following:    Your culture test was positive for Gonorrhea, a sexually transmitted infection. This was already treated appropriately in the Emergency Department. .       Based on the above findings it is recommended that you seek testing for the presence of additional sexually transmitted infections from the Health Department. Also, it is advised that you inform your sexual partner(s) within the previous 60 days of the above findings and direct them to the Health Department for testing. Should your symptoms progress, it is important that you follow up with your primary care physician, your local urgent care office, or return to the emergency department for further work up in order to prevent long term health issues. Also, continue taking the antibiotic sulfamethoxazole/trimethoprim.       Thank you for your cooperation in the matter.    Sincerely,  ED Culture Follow-Up Staff  Art Dexter, PharmD,    [facility], Emergency Department  [address], [place] [address] [phone] (ED Culture Line)  [phone]

## 2020-11-20 NOTE — ED TRIAGE NOTES
Pt to ED with complaints complaints of penile discharge and pain with urination x3 days. He reports pain has gotten much worse in last 24 hrs. He also has urinary frequency. Denies unprotected sex or known exposure to STI. No fever.     Mask in use. No respiratory complaints. No known covid exposure.     Pt educated on ED process and asked to wait in lobby. Patient educated on importance of alerting staff to new or worsening symptoms or concerns.

## 2020-11-21 NOTE — ED NOTES
"Pt discharged home via ambulatory to lobby with steady gait, AOx4. Pt in possession of belongings. Pt provided discharge education and information pertaining to medications and follow up appointments. Pt received copy of discharge instructions and verbalized understanding.     /66   Pulse 93   Temp 37.3 °C (99.1 °F) (Oral)   Resp 12   Ht 1.854 m (6' 1\")   Wt 74.9 kg (165 lb 2 oz)   SpO2 96%   BMI 21.79 kg/m²   "

## 2020-11-21 NOTE — ED PROVIDER NOTES
"ED Provider Note    CHIEF COMPLAINT  Chief Complaint   Patient presents with   • Painful Urination   • Penile Discharge       HPI  Narciso Wilson is a 40 y.o. male who presents for evaluation of dysuria and penile discharge.  Patient states symptoms happened 3 to 4 days ago and he notes he has been having intermittent subjective fevers as well.  He notes he has had a thick yellowish discharge from time to time and an increase in pain post urination.  He has had no blood in the urine, abdominal pain, or measured fevers at home.  Patient notes he may have been exposed to an STD however he has never had one before.    REVIEW OF SYSTEMS  Constitutional: Fevers or chills  Skin: No rashes  HEENT: No sore throat, runny nose, sores, trouble swallowing, trouble speaking.  Neck: No neck pain  Pulm: No shortness of breath, or cough  Gastrointestinal: No nausea, vomiting, diarrhea, constipation, bloating, melena, hematochezia or pain.  Genitourinary: No hematuria  Heme: No bleeding or bruising problems.   Immuno: No hx of recurrent infections      PAST MEDICAL HISTORY   abscess    SOCIAL HISTORY  Social History     Tobacco Use   • Smoking status: Current Every Day Smoker     Packs/day: 0.50   • Smokeless tobacco: Never Used   Substance and Sexual Activity   • Alcohol use: No   • Drug use: No   • Sexual activity: Not on file       SURGICAL HISTORY  patient denies any surgical history    CURRENT MEDICATIONS  Home Medications    **Home medications have not yet been reviewed for this encounter**         ALLERGIES  No Known Allergies    PHYSICAL EXAM  VITAL SIGNS: /72   Pulse (!) 106   Temp 37.3 °C (99.1 °F) (Temporal)   Resp 16   Ht 1.854 m (6' 1\")   Wt 74.9 kg (165 lb 2 oz)   SpO2 95%   BMI 21.79 kg/m²    Gen: Alert in no apparent distress.  HEENT: No signs of trauma, Bilateral external ears normal, Nose normal. Conjunctiva normal, Non-icteric.   Cardiovascular: Regular rate and rhythm, no murmurs.  Capillary refill " less than 3 seconds to upper extremities, 2+ distal pulses.  Thorax & Lungs: Normal breath sounds, No respiratory distress, No wheezing bilateral chest rise  Abdomen: Bowel sounds normal, Soft, No tenderness, No masses, No pulsatile masses. No Guarding or rebound  Skin: Warm, Dry, No erythema, No rash noted to exposed areas.   Extremities: Intact distal pulses, No edema to upper extremities          LABS  Results for orders placed or performed during the hospital encounter of 11/20/20   URINALYSIS CULTURE, IF INDICATED    Specimen: Urine, Clean Catch   Result Value Ref Range    Color Yellow     Character Turbid (A)     Specific Gravity 1.025 <1.035    Ph 7.5 5.0 - 8.0    Glucose Negative Negative mg/dL    Ketones Negative Negative mg/dL    Protein 100 (A) Negative mg/dL    Bilirubin Negative Negative    Urobilinogen, Urine 1.0 Negative    Nitrite Negative Negative    Leukocyte Esterase Moderate (A) Negative    Occult Blood Small (A) Negative    Micro Urine Req Microscopic    URINE MICROSCOPIC (W/UA)   Result Value Ref Range    WBC Packed (A) /hpf    RBC 10-20 (A) /hpf    Bacteria Negative None /hpf    Epithelial Cells Negative /hpf    Hyaline Cast 0-2 /lpf   Chlamydia/GC PCR Urine Or Swab    Specimen: Urine, First Catch; Genital   Result Value Ref Range    Source Urine            COURSE & MEDICAL DECISION MAKING  Patient arrives for evaluation of symptoms that are suggestive of urethritis.  We will plan on getting urinalysis and likely treating presumptively for STDs.  GC and Chlamydia will be added to the urine sample however will likely not return in a timely manner and therefore the patient will likely require empiric treatment.    Patient's urinalysis returned with significant evidence to suggest a urinary tract infection.  Patient additionally related some pain and pressure with defecation which could suggest prostatitis.  Given his systemic symptoms I feel it is best to treat for this with a long duration of  oral antibiotics.  At this point he does not appear septic or toxic and is notable that his heart rate was 88 on my reevaluation.  I did note he was mildly tachycardic on arrival however.  Patient stated understanding that we will also treat him for gonorrhea and chlamydia given his risk factors.  He will need to follow-up with his primary care physician in 2 to 4 weeks to ensure resolution as he may need a longer course of antibiotics.  The patient stated understanding of this and of the return instructions.    FINAL IMPRESSION  1. Urethritis    2. Acute UTI        Electronically signed by: Jessee Belcher M.D., 11/20/2020 4:51 PM

## 2020-11-23 LAB
BACTERIA UR CULT: ABNORMAL
BACTERIA UR CULT: ABNORMAL
C TRACH DNA SPEC QL NAA+PROBE: NEGATIVE
N GONORRHOEA DNA SPEC QL NAA+PROBE: POSITIVE
SIGNIFICANT IND 70042: ABNORMAL
SITE SITE: ABNORMAL
SOURCE SOURCE: ABNORMAL
SPECIMEN SOURCE: ABNORMAL

## 2020-11-24 NOTE — ED NOTES
"ED Positive Culture Follow-up/Notification Note:    Date: 11/24/2020     Patient seen in the ED on 11/20/2020 for dysuria and penile discharge.   1. Urethritis    2. Acute UTI       Discharge Medication List as of 11/20/2020  5:45 PM      START taking these medications    Details   sulfamethoxazole-trimethoprim (BACTRIM DS) 800-160 MG tablet Take 1 Tab by mouth 2 times a day for 28 days., Disp-56 Tab, R-0, Print Rx Paper           Given Azithromycin 1000 mg PO x1 and Ceftriaxone 250 mg IM x1 in ED     Allergies: Patient has no known allergies.     Vitals:    11/20/20 1525 11/20/20 1547 11/20/20 1753   BP: 136/72  111/66   Pulse: (!) 106  93   Resp: 16  12   Temp: 37.3 °C (99.1 °F)  37.3 °C (99.1 °F)   TempSrc: Temporal  Oral   SpO2: 95%  96%   Weight:  74.9 kg (165 lb 2 oz)    Height: 1.854 m (6' 1\") 1.854 m (6' 1\")        Final cultures:   Results     Procedure Component Value Units Date/Time    URINE CULTURE(NEW) [[id number]]  (Abnormal) Collected: 11/20/20 1620    Order Status: Completed Specimen: Urine Updated: 11/23/20 1522     Significant Indicator POS     Source UR     Site -     Culture Result -      Neisseria gonorrhoeae (Beta-Lactamase Negative)  10-50,000 cfu/mL      Narrative:      CALL  [facility]  ER tel. ,  CALLED  ER tel.  11/23/2020, 15:22, Faxed to [facility]  Indication for culture:->Patient WITHOUT an indwelling Cota  catheter in place with new onset of Dysuria, Frequency,  Urgency, and/or Suprapubic pain    Chlamydia/GC PCR Urine Or Swab [[id number]]  (Abnormal) Collected: 11/20/20 1620    Order Status: Completed Specimen: Genital from Urine, First Catch Updated: 11/23/20 1522     C. trachomatis by PCR Negative     N. gonorrhoeae by PCR POSITIVE     Source Urine    URINALYSIS CULTURE, IF INDICATED [[id number]]  (Abnormal) Collected: 11/20/20 1620    Order Status: Completed Specimen: Urine, Clean Catch Updated: 11/20/20 1708     Color Yellow     Character Turbid     Specific Gravity 1.025     Ph 7.5     Glucose " Negative mg/dL      Ketones Negative mg/dL      Protein 100 mg/dL      Bilirubin Negative     Urobilinogen, Urine 1.0     Nitrite Negative     Leukocyte Esterase Moderate     Occult Blood Small     Micro Urine Req Microscopic    Narrative:      Indication for culture:->Patient WITHOUT an indwelling Cota  catheter in place with new onset of Dysuria, Frequency,  Urgency, and/or Suprapubic pain          Plan:   Patient treated for gonorrhea in the ER. No additional treatment necessary. Unable to reach the patient via telephone. Sent letter notifying the patient to abstain from sexual intercourse 7 days after antibiotic therapy is completed, to notify any sexual partners within the last 60 days to go the [facility] Department for testing, to seek further STD/HIV testing, and to seek medical attention if symptoms persist. Neisseria gonorrheae in urine culture is likely spill over from STD.     Art Dexter, PharmD

## 2021-01-31 ENCOUNTER — APPOINTMENT (OUTPATIENT)
Dept: RADIOLOGY | Facility: MEDICAL CENTER | Age: 41
End: 2021-01-31
Attending: EMERGENCY MEDICINE
Payer: MEDICAID

## 2021-01-31 ENCOUNTER — HOSPITAL ENCOUNTER (EMERGENCY)
Facility: MEDICAL CENTER | Age: 41
End: 2021-01-31
Attending: EMERGENCY MEDICINE
Payer: MEDICAID

## 2021-01-31 VITALS
HEART RATE: 85 BPM | RESPIRATION RATE: 16 BRPM | BODY MASS INDEX: 21.65 KG/M2 | SYSTOLIC BLOOD PRESSURE: 113 MMHG | OXYGEN SATURATION: 97 % | HEIGHT: 73 IN | WEIGHT: 163.36 LBS | TEMPERATURE: 98.1 F | DIASTOLIC BLOOD PRESSURE: 66 MMHG

## 2021-01-31 DIAGNOSIS — G44.89 OTHER HEADACHE SYNDROME: ICD-10-CM

## 2021-01-31 DIAGNOSIS — M79.10 MYALGIA: ICD-10-CM

## 2021-01-31 LAB
FLUAV RNA SPEC QL NAA+PROBE: NEGATIVE
FLUBV RNA SPEC QL NAA+PROBE: NEGATIVE
RSV RNA SPEC QL NAA+PROBE: NEGATIVE
SARS-COV-2 RNA RESP QL NAA+PROBE: NOTDETECTED
SPECIMEN SOURCE: NORMAL

## 2021-01-31 PROCEDURE — 0241U HCHG SARS-COV-2 COVID-19 NFCT DS RESP RNA 4 TRGT MIC: CPT

## 2021-01-31 PROCEDURE — 70498 CT ANGIOGRAPHY NECK: CPT

## 2021-01-31 PROCEDURE — C9803 HOPD COVID-19 SPEC COLLECT: HCPCS | Performed by: EMERGENCY MEDICINE

## 2021-01-31 PROCEDURE — 99283 EMERGENCY DEPT VISIT LOW MDM: CPT

## 2021-01-31 PROCEDURE — 700102 HCHG RX REV CODE 250 W/ 637 OVERRIDE(OP): Performed by: EMERGENCY MEDICINE

## 2021-01-31 PROCEDURE — A9270 NON-COVERED ITEM OR SERVICE: HCPCS | Performed by: EMERGENCY MEDICINE

## 2021-01-31 PROCEDURE — 70496 CT ANGIOGRAPHY HEAD: CPT

## 2021-01-31 PROCEDURE — 700117 HCHG RX CONTRAST REV CODE 255: Performed by: EMERGENCY MEDICINE

## 2021-01-31 RX ORDER — HYDROCODONE BITARTRATE AND ACETAMINOPHEN 5; 325 MG/1; MG/1
1 TABLET ORAL ONCE
Status: COMPLETED | OUTPATIENT
Start: 2021-01-31 | End: 2021-01-31

## 2021-01-31 RX ADMIN — IOHEXOL 80 ML: 350 INJECTION, SOLUTION INTRAVENOUS at 15:36

## 2021-01-31 RX ADMIN — HYDROCODONE BITARTRATE AND ACETAMINOPHEN 1 TABLET: 5; 325 TABLET ORAL at 14:34

## 2021-01-31 ASSESSMENT — FIBROSIS 4 INDEX: FIB4 SCORE: 0.86

## 2021-01-31 NOTE — ED TRIAGE NOTES
"Narciso Wilson  Chief Complaint   Patient presents with   • Ear Pain     Pt complains of ear pain headache and n/v since 0200hrs. Pt denies OTC medications for symptom control.    • Headache   • N/V     Pt ambulatory to triage with above complaint.     /68   Pulse (!) 106   Temp 36.3 °C (97.3 °F) (Temporal)   Resp 16   Ht 1.854 m (6' 1\")   Wt 74.1 kg (163 lb 5.8 oz)   SpO2 96%   BMI 21.55 kg/m²     Pt informed of triage process and encouraged to notify staff of any changes or concerns. Pt verbalized understanding of instructions. Apologized for long wait time. Pt placed back in lobby.     "

## 2021-01-31 NOTE — ED PROVIDER NOTES
ED Provider Note    CHIEF COMPLAINT  Chief Complaint   Patient presents with   • Ear Pain     Pt complains of ear pain headache and n/v since 0200hrs. Pt denies OTC medications for symptom control.    • Headache   • N/V       HPI  Narciso Wilson is a 40 y.o. male who presents to the emergency department with headache, ear pain, nausea, achiness neck pain and generally not feeling well.  Symptoms started around 2:00 morning woke up with a headache.  Describes as a fairly abrupt headache but not quite like a thunderclap.  Headache is moderate in severity.  Hurts more when he turns head left to right with except the previously so severe bilateral ear pain and some nausea.  He reports subjective photophobia.  He is here with her  who has similar symptoms.  Denies any potential carbon oxide exposure.  Denies any history of aneurysm.  No antecedent febrile illness.  He does feel diffusely achy but no cough, shortness of breath or Covid exposures.  He denies any other acute concerns or complaints.    REVIEW OF SYSTEMS  See HPI for further details. All other systems are negative.    PAST MEDICAL HISTORY  History reviewed. No pertinent past medical history.    FAMILY HISTORY  History reviewed. No pertinent family history.    SOCIAL HISTORY  Social History     Socioeconomic History   • Marital status: Single     Spouse name: Not on file   • Number of children: Not on file   • Years of education: Not on file   • Highest education level: Not on file   Occupational History   • Not on file   Social Needs   • Financial resource strain: Not on file   • Food insecurity     Worry: Not on file     Inability: Not on file   • Transportation needs     Medical: Not on file     Non-medical: Not on file   Tobacco Use   • Smoking status: Current Every Day Smoker     Packs/day: 0.50   • Smokeless tobacco: Never Used   Substance and Sexual Activity   • Alcohol use: No   • Drug use: No   • Sexual activity: Not on file   Lifestyle   •  "Physical activity     Days per week: Not on file     Minutes per session: Not on file   • Stress: Not on file   Relationships   • Social connections     Talks on phone: Not on file     Gets together: Not on file     Attends Mandaen service: Not on file     Active member of club or organization: Not on file     Attends meetings of clubs or organizations: Not on file     Relationship status: Not on file   • Intimate partner violence     Fear of current or ex partner: Not on file     Emotionally abused: Not on file     Physically abused: Not on file     Forced sexual activity: Not on file   Other Topics Concern   • Not on file   Social History Narrative   • Not on file       SURGICAL HISTORY  History reviewed. No pertinent surgical history.    CURRENT MEDICATIONS  Home Medications    **Home medications have not yet been reviewed for this encounter**         ALLERGIES  No Known Allergies    PHYSICAL EXAM  VITAL SIGNS: /68   Pulse (!) 106   Temp 36.3 °C (97.3 °F) (Temporal)   Resp 16   Ht 1.854 m (6' 1\")   Wt 74.1 kg (163 lb 5.8 oz)   SpO2 96%   BMI 21.55 kg/m²      Constitutional: Well developed, Well nourished, No acute distress, Non-toxic appearance.   HENT: Normocephalic, Atraumatic, Bilateral external ears normal, Oropharynx moist, No oral exudates, Nose normal.  TMs are normal  Eyes: PERRL, EOMI, Conjunctiva normal, No discharge.  No photophobia  Neck: Normal range of motion, No tenderness, no nuchal rigidity  Cardiovascular: Normal heart rate, Normal rhythm, No murmurs  Thorax & Lungs: Normal breath sounds, No respiratory distress  Abdomen: Bowel sounds normal, Soft, No tenderness  Skin: Warm, Dry, No erythema, No rash.   Musculoskeletal: Good range of motion in all major joints.  Neurologic: Alert, No focal deficits noted.   Psychiatric: Anxious        RADIOLOGY/PROCEDURES  CT-CTA HEAD WITH & W/O-POST PROCESS   Final Result      CT angiogram of the [place] of [place] within normal limits.      No " acute intracranial abnormality.      CT-CTA NECK WITH & W/O-POST PROCESSING   Final Result      No focal stenosis, dissection or occlusion of the cervical carotid or vertebral arteries.          COURSE & MEDICAL DECISION MAKING  Pertinent Labs & Imaging studies reviewed. (See chart for details)    The patient presents emerged department with an abrupt onset of headache earlier today.  This crescendoed fairly rapidly and was quite severe.  Associate some neck pain and photophobia at least initially.  The patient denies any history of chronic headaches like migraines.  He denies any history of trauma.    A broad differential diagnosis was considered including but not limited to subarachnoid hemorrhage, COVID-19 infection, mass, hemorrhage, meningitis, carbon monoxide poisoning, dural sinus thrombosis to name a few.    The patient is worked up with CTs and given a [place].  I did CT and CTA to rule out dissection and aneurysm and hemorrhage.  He is outside of the window for seeing blood with a high degree of accuracy for a noncontrast head CT but I do not see an aneurysmal dilation.  He does not have any evidence of dissection.  There is no hemorrhage or mass.    The patient is not febrile ill or toxic he is reassessed after oral pain pill and is feeling quite well he is jovial his headache is essentially gone he has again no nuchal rigidity or photophobia.  He does not appear septic or toxic or have evidence of bacterial meningitis.  We have discussed the pros and cons of the spinal tap running as well as to be low yield and he does not want to pursue this at this time.  I think it is not indicated because the patient is well-appearing.    He turns out to be living with someone who is here for the same complaints.  He states that all have a new heater or old heater and there is nothing new or different.  He states there is no risk for carbon oxide exposure or poisoning.    Patient is feeling well he is afebrile nontoxic  in all the blood tests are helpful to be discharged home to follow-up with his doctor.  Return for any new medical problems or complaints.  He does not have a doctor he can follow-up to [facility].  He is discharged in good condition.    The patient was noted to have elevated blood pressure while in the ER and was counseled to see their doctor within one wee to have this rechecked.    FINAL IMPRESSION  1. Other headache syndrome     2. Myalgia         2.   3.         Electronically signed by: Vignesh Wall M.D., 1/31/2021 2:54 PM

## 2021-02-01 NOTE — ED NOTES
Assist RN: First contact with patient. Provided with saltine crackers and apple juice for PO Challenge as per Dr. Wall.

## 2021-02-01 NOTE — ED NOTES
Assist RN: Patient discharged in stable condition per orders. IV access removed - bandage applied. Wristband removed per protocol. Patient verbalized understanding of all discharge instructions. All belongings accounted for. Ambulatory to lobby with steady gait.

## 2021-02-01 NOTE — DISCHARGE INSTRUCTIONS
Return emergency department if you have worsening headache, if you have fever, or other concerns.  Follow-up with [facility] doctor or [facility].  Rest, drink plenty of fluids.

## 2021-10-07 NOTE — ED NOTES
ED Tech at bedside for thumb spica splint placement.   Previous Accession (Optional): [id number] Previous Accession (Optional): [id number]

## 2022-07-08 ENCOUNTER — HOSPITAL ENCOUNTER (EMERGENCY)
Facility: MEDICAL CENTER | Age: 42
End: 2022-07-08
Attending: EMERGENCY MEDICINE
Payer: MEDICAID

## 2022-07-08 VITALS
OXYGEN SATURATION: 94 % | HEART RATE: 77 BPM | WEIGHT: 156.75 LBS | RESPIRATION RATE: 16 BRPM | TEMPERATURE: 97.9 F | BODY MASS INDEX: 20.77 KG/M2 | SYSTOLIC BLOOD PRESSURE: 105 MMHG | HEIGHT: 73 IN | DIASTOLIC BLOOD PRESSURE: 67 MMHG

## 2022-07-08 DIAGNOSIS — R10.30 LOWER ABDOMINAL PAIN: ICD-10-CM

## 2022-07-08 DIAGNOSIS — N30.01 ACUTE CYSTITIS WITH HEMATURIA: ICD-10-CM

## 2022-07-08 LAB
ALBUMIN SERPL BCP-MCNC: 3.8 G/DL (ref 3.2–4.9)
ALBUMIN/GLOB SERPL: 1 G/DL
ALP SERPL-CCNC: 80 U/L (ref 30–99)
ALT SERPL-CCNC: 12 U/L (ref 2–50)
ANION GAP SERPL CALC-SCNC: 9 MMOL/L (ref 7–16)
APPEARANCE UR: ABNORMAL
AST SERPL-CCNC: 10 U/L (ref 12–45)
BACTERIA #/AREA URNS HPF: ABNORMAL /HPF
BASOPHILS # BLD AUTO: 0.8 % (ref 0–1.8)
BASOPHILS # BLD: 0.12 K/UL (ref 0–0.12)
BILIRUB SERPL-MCNC: 0.5 MG/DL (ref 0.1–1.5)
BILIRUB UR QL STRIP.AUTO: NEGATIVE
BUN SERPL-MCNC: 16 MG/DL (ref 8–22)
CALCIUM SERPL-MCNC: 9.3 MG/DL (ref 8.5–10.5)
CHLORIDE SERPL-SCNC: 101 MMOL/L (ref 96–112)
CO2 SERPL-SCNC: 28 MMOL/L (ref 20–33)
COLOR UR: ABNORMAL
CREAT SERPL-MCNC: 0.96 MG/DL (ref 0.5–1.4)
EOSINOPHIL # BLD AUTO: 0.87 K/UL (ref 0–0.51)
EOSINOPHIL NFR BLD: 5.5 % (ref 0–6.9)
ERYTHROCYTE [DISTWIDTH] IN BLOOD BY AUTOMATED COUNT: 41.8 FL (ref 35.9–50)
GFR SERPLBLD CREATININE-BSD FMLA CKD-EPI: 101 ML/MIN/1.73 M 2
GLOBULIN SER CALC-MCNC: 3.9 G/DL (ref 1.9–3.5)
GLUCOSE SERPL-MCNC: 111 MG/DL (ref 65–99)
GLUCOSE UR STRIP.AUTO-MCNC: NEGATIVE MG/DL
HCT VFR BLD AUTO: 42.6 % (ref 42–52)
HGB BLD-MCNC: 13.6 G/DL (ref 14–18)
IMM GRANULOCYTES # BLD AUTO: 0.07 K/UL (ref 0–0.11)
IMM GRANULOCYTES NFR BLD AUTO: 0.4 % (ref 0–0.9)
KETONES UR STRIP.AUTO-MCNC: ABNORMAL MG/DL
LEUKOCYTE ESTERASE UR QL STRIP.AUTO: ABNORMAL
LIPASE SERPL-CCNC: 36 U/L (ref 11–82)
LYMPHOCYTES # BLD AUTO: 1.08 K/UL (ref 1–4.8)
LYMPHOCYTES NFR BLD: 6.8 % (ref 22–41)
MCH RBC QN AUTO: 27.6 PG (ref 27–33)
MCHC RBC AUTO-ENTMCNC: 31.9 G/DL (ref 33.7–35.3)
MCV RBC AUTO: 86.4 FL (ref 81.4–97.8)
MICRO URNS: ABNORMAL
MONOCYTES # BLD AUTO: 0.84 K/UL (ref 0–0.85)
MONOCYTES NFR BLD AUTO: 5.3 % (ref 0–13.4)
NEUTROPHILS # BLD AUTO: 12.9 K/UL (ref 1.82–7.42)
NEUTROPHILS NFR BLD: 81.2 % (ref 44–72)
NITRITE UR QL STRIP.AUTO: NEGATIVE
NRBC # BLD AUTO: 0 K/UL
NRBC BLD-RTO: 0 /100 WBC
PH UR STRIP.AUTO: 6.5 [PH] (ref 5–8)
PLATELET # BLD AUTO: 331 K/UL (ref 164–446)
PMV BLD AUTO: 10 FL (ref 9–12.9)
POTASSIUM SERPL-SCNC: 4.3 MMOL/L (ref 3.6–5.5)
PROT SERPL-MCNC: 7.7 G/DL (ref 6–8.2)
PROT UR QL STRIP: 300 MG/DL
RBC # BLD AUTO: 4.93 M/UL (ref 4.7–6.1)
RBC # URNS HPF: ABNORMAL /HPF
RBC UR QL AUTO: ABNORMAL
SODIUM SERPL-SCNC: 138 MMOL/L (ref 135–145)
SP GR UR STRIP.AUTO: 1.03
UROBILINOGEN UR STRIP.AUTO-MCNC: 2 MG/DL
WBC # BLD AUTO: 15.9 K/UL (ref 4.8–10.8)
WBC #/AREA URNS HPF: ABNORMAL /HPF

## 2022-07-08 PROCEDURE — A9270 NON-COVERED ITEM OR SERVICE: HCPCS | Performed by: EMERGENCY MEDICINE

## 2022-07-08 PROCEDURE — 81001 URINALYSIS AUTO W/SCOPE: CPT

## 2022-07-08 PROCEDURE — 700101 HCHG RX REV CODE 250: Performed by: EMERGENCY MEDICINE

## 2022-07-08 PROCEDURE — 87086 URINE CULTURE/COLONY COUNT: CPT

## 2022-07-08 PROCEDURE — 700111 HCHG RX REV CODE 636 W/ 250 OVERRIDE (IP): Performed by: EMERGENCY MEDICINE

## 2022-07-08 PROCEDURE — 96372 THER/PROPH/DIAG INJ SC/IM: CPT

## 2022-07-08 PROCEDURE — 80053 COMPREHEN METABOLIC PANEL: CPT

## 2022-07-08 PROCEDURE — 99284 EMERGENCY DEPT VISIT MOD MDM: CPT

## 2022-07-08 PROCEDURE — 36415 COLL VENOUS BLD VENIPUNCTURE: CPT

## 2022-07-08 PROCEDURE — 700102 HCHG RX REV CODE 250 W/ 637 OVERRIDE(OP): Performed by: EMERGENCY MEDICINE

## 2022-07-08 PROCEDURE — 83690 ASSAY OF LIPASE: CPT

## 2022-07-08 PROCEDURE — 85025 COMPLETE CBC W/AUTO DIFF WBC: CPT

## 2022-07-08 RX ORDER — METRONIDAZOLE 500 MG/1
2000 TABLET ORAL ONCE
Status: COMPLETED | OUTPATIENT
Start: 2022-07-08 | End: 2022-07-08

## 2022-07-08 RX ORDER — ONDANSETRON 4 MG/1
4 TABLET, ORALLY DISINTEGRATING ORAL EVERY 6 HOURS PRN
Qty: 10 TABLET | Refills: 0 | Status: SHIPPED | OUTPATIENT
Start: 2022-07-08 | End: 2023-10-29

## 2022-07-08 RX ORDER — CEFDINIR 300 MG/1
300 CAPSULE ORAL 2 TIMES DAILY
Qty: 14 CAPSULE | Refills: 0 | Status: SHIPPED | OUTPATIENT
Start: 2022-07-08 | End: 2022-07-15

## 2022-07-08 RX ORDER — CEFTRIAXONE 500 MG/1
500 INJECTION, POWDER, FOR SOLUTION INTRAMUSCULAR; INTRAVENOUS ONCE
Status: COMPLETED | OUTPATIENT
Start: 2022-07-08 | End: 2022-07-08

## 2022-07-08 RX ORDER — AZITHROMYCIN 250 MG/1
1000 TABLET, FILM COATED ORAL ONCE
Status: COMPLETED | OUTPATIENT
Start: 2022-07-08 | End: 2022-07-08

## 2022-07-08 RX ORDER — ONDANSETRON 4 MG/1
4 TABLET, ORALLY DISINTEGRATING ORAL ONCE
Status: COMPLETED | OUTPATIENT
Start: 2022-07-08 | End: 2022-07-08

## 2022-07-08 RX ADMIN — ONDANSETRON 4 MG: 4 TABLET, ORALLY DISINTEGRATING ORAL at 21:03

## 2022-07-08 RX ADMIN — CEFTRIAXONE SODIUM 500 MG: 500 INJECTION, POWDER, FOR SOLUTION INTRAMUSCULAR; INTRAVENOUS at 20:12

## 2022-07-08 RX ADMIN — LIDOCAINE HYDROCHLORIDE 1 ML: 10 INJECTION, SOLUTION EPIDURAL; INFILTRATION; INTRACAUDAL; PERINEURAL at 20:12

## 2022-07-08 RX ADMIN — METRONIDAZOLE 2000 MG: 500 TABLET ORAL at 20:12

## 2022-07-08 RX ADMIN — AZITHROMYCIN MONOHYDRATE 1000 MG: 250 TABLET ORAL at 20:13

## 2022-07-08 NOTE — LETTER
7/13/2022               Narciso Wilson  [address] [place] [id number]        Dear Narciso (MR#[id number])    As we have been unable to contact you by phone, this letter is sent in regards to your, recent visit to the [facility] Emergency Department on 7/8/2022. During the visit, tests were performed to assist the physician in your medical diagnosis. A review of your tests requires that we notify you of the following:    Your culture test was POSITIVE for Gonorrhea, a sexually transmitted infection. This was already treated appropriately in the Emergency Department.       Based on the above findings it is recommended that you seek testing for the presence of additional sexually transmitted infections, hepatitis, and HIV from the Health Department. Also, it is advised that you inform your sexual partner(s) within the previous 60 days of the above findings and direct them to the Health Department for testing, and to abstain from sexual intercourse seven days after the completion of antibiotic treatment. Should your symptoms progress, it is important that you follow up with your primary care physician, your local urgent care office, or return to the emergency department for further work up in order to prevent long term health issues.      Thank you for your cooperation in the matter.    Sincerely,  ED Culture Follow-Up Staff  Nela Plata, PharmD    [facility], Emergency Department  [address], [place] [address] [phone] (ED Culture Line)

## 2022-07-09 NOTE — ED NOTES
Discharge teaching and paperwork provided regarding cystitis & hematuria and all questions/concerns answered. VSS,  assessment stable. Given information regarding home care and reasons to follow up with ED or primary MD. Patient discharged and ambulatory out of the ED.

## 2022-07-09 NOTE — ED TRIAGE NOTES
Narciso Wilson  41 y.o. male  Chief Complaint   Patient presents with   • Urinary Pain     X 4 days, worse last night, frequency, denies hematuria, reports cloudy urine   • Neck Pain     R side neck pain x 4 days, worse today   • Abdominal Pain     6/10 bilateral lower abdominal pain, onset last night       Pt ambulatory to triage with steady gait for above complaint.     Pt is GCS 15, speaking in full sentences, follows commands and responds appropriately to questions. Resp are even and unlabored.    Abdominal pain protocol ordered. Pt placed in draw room. Urine sample sent from triage. Pt educated on triage process. Pt encouraged to alert staff for any changes.     This RN masked and in appropriate PPE during encounter.     Vitals:    07/08/22 1749   BP: 120/68   Pulse: (!) 105   Resp: 16   Temp: 36.6 °C (97.8 °F)   SpO2: 96%

## 2022-07-11 LAB
BACTERIA UR CULT: ABNORMAL
BACTERIA UR CULT: ABNORMAL
SIGNIFICANT IND 70042: ABNORMAL
SITE SITE: ABNORMAL
SOURCE SOURCE: ABNORMAL

## 2022-07-13 NOTE — ED NOTES
"ED Positive Culture Follow-up/Notification Note:    Date: 7/13/2022     Patient seen in the ED on 7/8/2022 for dysuria, penile discharge and exposure to STD.   1. Acute cystitis with hematuria    2. Lower abdominal pain      Given Ceftriaxone 500 mg IM, Azithromycin 1 g po and Metronidazole 2g PO in the ED on 7/8/22    Discharge Medication List as of 7/8/2022  8:59 PM      START taking these medications    Details   cefdinir (OMNICEF) 300 MG Cap Take 1 Capsule by mouth 2 times a day for 7 days., Disp-14 Capsule, R-0, Normal      ondansetron (ZOFRAN ODT) 4 MG TABLET DISPERSIBLE Take 1 Tablet by mouth every 6 hours as needed for Nausea., Disp-10 Tablet, R-0, Normal             Allergies: Patient has no known allergies.     Vitals:    07/08/22 1749 07/08/22 1808 07/08/22 2018   BP: 120/68  105/67   Pulse: (!) 105  77   Resp: 16  16   Temp: 36.6 °C (97.8 °F)  36.6 °C (97.9 °F)   TempSrc: Temporal  Temporal   SpO2: 96%  94%   Weight:  71.1 kg (156 lb 12 oz)    Height: 1.854 m (6' 1\")         Final cultures:   Results     Procedure Component Value Units Date/Time    URINE CULTURE(NEW) [[id number]]  (Abnormal) Collected: 07/08/22 1818    Order Status: Completed Specimen: Urine Updated: 07/11/22 1833     Significant Indicator POS     Source UR     Site -     Culture Result Usual skin evita <10,000 cfu/mL      Neisseria gonorrhoeae (Beta-Lactamase Negative)  10-50,000 cfu/mL      Narrative:      CALL  [facility]  ER tel. , Please call positive N. gonorrhoeae  Indication for culture:->Patient WITHOUT an indwelling Cota  catheter in place with new onset of Dysuria, Frequency,  Urgency, and/or Suprapubic pain    URINALYSIS CULTURE, IF INDICATED [[id number]]  (Abnormal) Collected: 07/08/22 1818    Order Status: Completed Specimen: Urine Updated: 07/08/22 2001     Color DK Yellow     Character Turbid     Specific Gravity 1.030     Ph 6.5     Glucose Negative mg/dL      Ketones Trace mg/dL      Protein 300 mg/dL      Bilirubin Negative "     Urobilinogen, Urine 2.0     Nitrite Negative     Leukocyte Esterase Large     Occult Blood Small     Micro Urine Req Microscopic    Narrative:      Indication for culture:->Patient WITHOUT an indwelling Cota  catheter in place with new onset of Dysuria, Frequency,  Urgency, and/or Suprapubic pain    Chlamydia/GC, PCR (Urine) [[id number]] Collected: 07/08/22 1923    Order Status: Sent Specimen: Urine Updated: 07/08/22 1933    URINALYSIS [[id number]]     Order Status: Canceled Specimen: Urine           Plan:   Patient treated for gonorrhea while in the ED.  Urine culture only showing gonorrhea. No longer requires treatment for UTI with cefdinir. Patient treated for gonorrhea in the ER. No additional treatment necessary.     Attempted to contact the patient via telephone, but the number listed was not in service.  Will send him a letter to notify of result and  the patient to abstain from sexual intercourse 7 days after antibiotic therapy is completed, to notify any sexual partners within the last 60 days to go the the Health Department for testing, to seek further HIV/STD testing, and to seek medical attention if symptoms persist.    Nela Plata, PharmD

## 2023-05-29 NOTE — ED PROVIDER NOTES
"ED Provider Note    CHIEF COMPLAINT  Chief Complaint   Patient presents with   • Urinary Pain     X 4 days, worse last night, frequency, denies hematuria, reports cloudy urine   • Neck Pain     R side neck pain x 4 days, worse today   • Abdominal Pain     6/10 bilateral lower abdominal pain, onset last night       HPI  Narciso Wilson is a 41 y.o. male who presents to the emergency room and with four days of dysuria as well as a few days of penile discharge which is since resolved. Now additionally having some mid lower abdominal discomfort. He explained that he was with a partner of recent which she states is \"dirty\". Reportedly having had recent yeast infection. He questions additional STDs. At this point he is concerned about STDs as well as possible urinary tract infection. States that he had similar discomfort approximate one year ago after having intercourse with another individual.    Despite triage documentation of neck discomfort he is currently denying any discomfort at this time.     REVIEW OF SYSTEMS  See HPI for further details. All other systems are negative.     PAST MEDICAL HISTORY       SOCIAL HISTORY  Social History     Tobacco Use   • Smoking status: Current Every Day Smoker     Packs/day: 0.25   • Smokeless tobacco: Never Used   Vaping Use   • Vaping Use: Every day   • Substances: Nicotine, THC, CBD   • Devices: Pre-filled or refillable cartridge   Substance and Sexual Activity   • Alcohol use: No   • Drug use: Yes     Comment: marijuana, former meth   • Sexual activity: Not on file       SURGICAL HISTORY  patient denies any surgical history    CURRENT MEDICATIONS  Home Medications     Reviewed by Juice Ramon R.N. (Registered Nurse) on 07/08/22 at 1813  Med List Status: Partial   Medication Last Dose Status        Patient Femi Taking any Medications                       ALLERGIES  No Known Allergies    PHYSICAL EXAM  VITAL SIGNS: /67   Pulse 77   Temp 36.6 °C (97.9 °F) (Temporal)  " [facility] Access Inpatient Bed Call Log 5/29/2023 7:21 AM      Intake has called facilities that have not updated their bed status within the last 12 hours.       Adults:                [facility] is posting 0 beds.              [facility] is posting 0 [phone]              [facility] is posting 0 beds. [phone]             [facility] is posting 0 beds. [phone]             [facility] is posting 0 beds. [phone]             [facility] is posting 0 beds. [phone]             [facility] is posting 0 beds. [phone]             [facility] is posting 0 beds. [phone]             [facility], is posting 2 beds. [phone]             [facility] is posting 0 beds. [phone]                  [facility] is posting 4 beds. Mixed unit 12+. Low acuity only.  [phone]             [facility] is positing 1 beds. No aggression.  [phone]             [facility] is posting 0 beds. [phone]             [facility] is posting 0 beds. Low acuity only. [phone] 7:33a Per Caty, no beds.              [facility] is posting 2 beds. [phone]             [facility] is posting 0 beds. Low acuity. [phone] 7:33a Per Caty, no beds.             [facility] is posting 0 beds. 72 hr hold preferred. [phone]:28a Per Dede, no beds available today.              [facility] is posting 1 beds. [phone]:33a Per Caty, 1 low acuity bed available.                   [facility] is posting 0 beds. Vol only, No Hx of aggression, violence, or assault. No sexual offenders. No 72 hr holds. [phone] 7:29a Per [facility], beds available.                 [facility] is posting 7 beds. (Must have the cognitive ability to do programming. No aggressive or violent behavior or recent HX in the last 2 yrs. MH must be primary.) [phone]              [facility] is posting 0 beds. Low acuity only. Violence and aggression capped. [phone]     [facility] is posting 1 beds. Low acuity, Neg Covid. [phone]       [facility] is posting 4 beds. Covid neg. Vol only. Combined adolescent and adult unit. No aggressive or violent behavior. No registered sex offenders. [phone] 7:26a Per [facility], 3 beds available.     [facility] posting 0 beds. Negative covid.     [facility] is posting 1 beds.   [phone] 7:32a No Answer.      [facility] is posting 8 beds. Call for details. [phone] 7:24a Per Opal, 1 adult bed, no high acuity.     [facility] is posting 4 beds. [phone]   Pt remains on work list pending appropriate bed availability.      " Resp 16   Ht 1.854 m (6' 1\")   Wt 71.1 kg (156 lb 12 oz)   SpO2 94%   BMI 20.68 kg/m²  @ADRY[[id number]::@   Pulse ox interpretation: I interpret this pulse ox as normal.  Constitutional: Alert in no apparent distress.  HENT: No signs of trauma, Bilateral external ears normal, Nose normal.   Eyes: Pupils are equal and reactive  Neck: Normal range of motion, No tenderness, Supple  Cardiovascular: Regular rate and rhythm, no murmurs.   Thorax & Lungs: Normal breath sounds, No respiratory distress, No wheezing, No chest tenderness.   Abdomen: Bowel sounds normal, Soft, mild suprapubic tenderness  : bilaterally distended testes. Testicles and scrotum are nontender. Pain is a circumcised with no lesions and no current discharge. He has bilateral inguinal lymphadenopathy.  Skin: Warm, Dry, No erythema, No rash.   Back: No bony tenderness, No CVA tenderness.   Extremities: Intact distal pulses  Musculoskeletal: Good range of motion in all major joints. No tenderness to palpation or major deformities noted.   Neurologic: Alert , Normal motor function, Normal sensory function, No focal deficits noted.   Psychiatric: Affect normal, Judgment normal, Mood normal.       DIAGNOSTIC STUDIES / PROCEDURES      LABS  Results for orders placed or performed during the hospital encounter of 07/08/22   CBC WITH DIFFERENTIAL   Result Value Ref Range    WBC 15.9 (H) 4.8 - 10.8 K/uL    RBC 4.93 4.70 - 6.10 M/uL    Hemoglobin 13.6 (L) 14.0 - 18.0 g/dL    Hematocrit 42.6 42.0 - 52.0 %    MCV 86.4 81.4 - 97.8 fL    MCH 27.6 27.0 - 33.0 pg    MCHC 31.9 (L) 33.7 - 35.3 g/dL    RDW 41.8 35.9 - 50.0 fL    Platelet Count 331 164 - 446 K/uL    MPV 10.0 9.0 - 12.9 fL    Neutrophils-Polys 81.20 (H) 44.00 - 72.00 %    Lymphocytes 6.80 (L) 22.00 - 41.00 %    Monocytes 5.30 0.00 - 13.40 %    Eosinophils 5.50 0.00 - 6.90 %    Basophils 0.80 0.00 - 1.80 %    Immature Granulocytes 0.40 0.00 - 0.90 %    Nucleated RBC 0.00 /100 WBC    Neutrophils " (Absolute) 12.90 (H) 1.82 - 7.42 K/uL    Lymphs (Absolute) 1.08 1.00 - 4.80 K/uL    Monos (Absolute) 0.84 0.00 - 0.85 K/uL    Eos (Absolute) 0.87 (H) 0.00 - 0.51 K/uL    Baso (Absolute) 0.12 0.00 - 0.12 K/uL    Immature Granulocytes (abs) 0.07 0.00 - 0.11 K/uL    NRBC (Absolute) 0.00 K/uL   COMP METABOLIC PANEL   Result Value Ref Range    Sodium 138 135 - 145 mmol/L    Potassium 4.3 3.6 - 5.5 mmol/L    Chloride 101 96 - 112 mmol/L    Co2 28 20 - 33 mmol/L    Anion Gap 9.0 7.0 - 16.0    Glucose 111 (H) 65 - 99 mg/dL    Bun 16 8 - 22 mg/dL    Creatinine 0.96 0.50 - 1.40 mg/dL    Calcium 9.3 8.5 - 10.5 mg/dL    AST(SGOT) 10 (L) 12 - 45 U/L    ALT(SGPT) 12 2 - 50 U/L    Alkaline Phosphatase 80 30 - 99 U/L    Total Bilirubin 0.5 0.1 - 1.5 mg/dL    Albumin 3.8 3.2 - 4.9 g/dL    Total Protein 7.7 6.0 - 8.2 g/dL    Globulin 3.9 (H) 1.9 - 3.5 g/dL    A-G Ratio 1.0 g/dL   LIPASE   Result Value Ref Range    Lipase 36 11 - 82 U/L   URINALYSIS CULTURE, IF INDICATED    Specimen: Urine   Result Value Ref Range    Color DK Yellow     Character Turbid (A)     Specific Gravity 1.030 <1.035    Ph 6.5 5.0 - 8.0    Glucose Negative Negative mg/dL    Ketones Trace (A) Negative mg/dL    Protein 300 (A) Negative mg/dL    Bilirubin Negative Negative    Urobilinogen, Urine 2.0 Negative    Nitrite Negative Negative    Leukocyte Esterase Large (A) Negative    Occult Blood Small (A) Negative    Micro Urine Req Microscopic    ESTIMATED GFR   Result Value Ref Range    GFR (CKD-EPI) 101 >60 mL/min/1.73 m 2   URINE MICROSCOPIC (W/UA)   Result Value Ref Range    WBC Packed (A) /hpf    RBC 2-5 (A) /hpf    Bacteria Many (A) None /hpf         RADIOLOGY  No orders to display           COURSE & MEDICAL DECISION MAKING  Pertinent Labs & Imaging studies reviewed. (See chart for details)  41-year-old male presented emergency department with the above presentation. Evidence of acute cystitis and possible borderline pyelonephritis given his clinical  presentation. Patient does have additional concerned about possible STD exposure and therefore he has been treated empirically for this. He will be continued on oral antibiotics given his current urinalysis. Overall he does not appear clinically unwell and I do not believe that he will require ongoing inpatient care. He is understanding return precautions that should he have any changes or worsening however.       The patient will return for worsening symptoms and is stable at the time of discharge. The patient verbalizes understanding and will comply.    FINAL IMPRESSION  1. Acute cystitis with hematuria    2. Lower abdominal pain            Electronically signed by: Vignesh Chan M.D., 7/8/2022 8:50 PM

## 2023-10-29 ENCOUNTER — HOSPITAL ENCOUNTER (EMERGENCY)
Facility: MEDICAL CENTER | Age: 43
End: 2023-10-29
Attending: EMERGENCY MEDICINE
Payer: MEDICAID

## 2023-10-29 VITALS
HEART RATE: 69 BPM | TEMPERATURE: 98.2 F | RESPIRATION RATE: 17 BRPM | DIASTOLIC BLOOD PRESSURE: 58 MMHG | WEIGHT: 165 LBS | SYSTOLIC BLOOD PRESSURE: 98 MMHG | OXYGEN SATURATION: 94 % | BODY MASS INDEX: 21.77 KG/M2

## 2023-10-29 DIAGNOSIS — T18.108A FOREIGN BODY IN ESOPHAGUS, INITIAL ENCOUNTER: ICD-10-CM

## 2023-10-29 PROCEDURE — 99284 EMERGENCY DEPT VISIT MOD MDM: CPT

## 2023-10-29 PROCEDURE — 700102 HCHG RX REV CODE 250 W/ 637 OVERRIDE(OP): Mod: UD

## 2023-10-29 PROCEDURE — A9270 NON-COVERED ITEM OR SERVICE: HCPCS | Mod: UD

## 2023-10-29 PROCEDURE — 96374 THER/PROPH/DIAG INJ IV PUSH: CPT

## 2023-10-29 PROCEDURE — 700111 HCHG RX REV CODE 636 W/ 250 OVERRIDE (IP): Mod: JZ,UD

## 2023-10-29 RX ORDER — NITROGLYCERIN 0.4 MG/1
0.4 TABLET SUBLINGUAL ONCE
Status: COMPLETED | OUTPATIENT
Start: 2023-10-29 | End: 2023-10-29

## 2023-10-29 RX ADMIN — NITROGLYCERIN 0.4 MG: 0.4 TABLET, ORALLY DISINTEGRATING SUBLINGUAL at 20:08

## 2023-10-29 RX ADMIN — GLUCAGON 1 MG: KIT at 20:08

## 2023-10-29 ASSESSMENT — PAIN DESCRIPTION - PAIN TYPE
TYPE: ACUTE PAIN
TYPE: ACUTE PAIN

## 2023-10-29 ASSESSMENT — FIBROSIS 4 INDEX: FIB4 SCORE: 0.38

## 2023-10-30 NOTE — ED NOTES
Pt discharged . GCS 15. IV discontinued and gauze placed, pt in possession of belongings. Pt provided discharge education and information pertaining to medications and follow up appointments. Pt received copy of discharge instructions and verbalized understanding.     Vitals:    10/29/23 2100   BP: 98/58   Pulse: 69   Resp: 17   Temp: 36.8 °C (98.2 °F)   SpO2: 94%

## 2023-10-30 NOTE — ED PROVIDER NOTES
"ER Provider Note    Primary Care Provider: None    CHIEF COMPLAINT  Chief Complaint   Patient presents with    Other     Patient reports was eating steak about 1740 tonight and feels like it's now stuck in his throat.        HPI/ROS    Narciso Wilson is a 43 y.o. male who presents to the ED complaining of foreign body in esophagus.  Patient reports that he ate a piece of steak this evening at about 530-6 PM and it became lodged in his esophagus.  Patient reports feeling of discomfort above his sternum.  Initially, after this happened, patient tried drinking a thick juice (\"naked juice\") which he immediately vomited back up.  Patient does report mild nausea but he is unable to produce emesis.  Patient reports previously healthy, does not take any medications, although he does not have a primary care doctor.  Patient has history of tobacco use, smokes 1 pack/day cigarettes.  He denies alcohol use, but reports using marijuana (smokes) and methamphetamines (smokes).  He denies IV drug use.    PAST MEDICAL HISTORY  History reviewed. No pertinent past medical history.    SURGICAL HISTORY  History reviewed. No pertinent surgical history.    FAMILY HISTORY  History reviewed. No pertinent family history.    SOCIAL HISTORY   reports that he has been smoking cigarettes. He has never used smokeless tobacco. He reports current drug use. Drug: Inhaled. He reports that he does not drink alcohol.    CURRENT MEDICATIONS  There are no discharge medications for this patient.      ALLERGIES  Patient has no known allergies.    PHYSICAL EXAM  BP 98/58   Pulse 69   Temp 36.8 °C (98.2 °F) (Temporal)   Resp 17   Wt 74.8 kg (165 lb)   SpO2 94%   BMI 21.77 kg/m²   Gen:    Alert and oriented, No apparent distress.  HEENT: NCAT, MMM, No lymphadenopathy. Oropharynx clear. Poor dentition.  Lungs: Normal work of breathing without accessory muscle usage. CTA bilaterally. no wheezes, rhonchi, or rales.  CV:      Regular rate and rhythm. No " murmurs, rubs, or gallops. Radial pulses palpable bilat.  Abd:     Soft, non-distended. No rebound. No guarding. Non-tender to palpation.  Ext:      No clubbing. No cyanosis. No edema.   Neuro: Non-focal  Psych: Normal mood and affect.    DIAGNOSTIC STUDIES  No labs, EKG, or imaging indicated.    COURSE & MEDICAL DECISION MAKING   ED Observation Status? Yes; I am placing the patient in to an observation status due to a diagnostic uncertainty as well as therapeutic intensity. Patient placed in observation status at 1900 AM, 10/30/2023.     Observation plan is as follows: Patient under observation to assess for response to intervention for foreign body in esophagus.    Upon Reevaluation, the patient's condition has: Improved; and will be discharged.    Patient discharged from ED Observation status at 2100 (Time) 10/29/23 (Date).     INITIAL ASSESSMENT, COURSE AND PLAN  Care Narrative:   43-year-old male here for foreign body in esophagus.  Upon evaluation, he was able to take small sips of water but he could still feel the bolus in his esophagus superior to his sternum.  1944 saw patient. will try Coca-Cola, sublingual nitroglycerin, and glucagon 1mg IV.  2030 patient received medications and Coca-Cola, he reports that he felt the bolus moved down and discomfort resolved.  Patient was given water challenge.  2100 patient passed water challenge, able to drink a entire cup of water and short period of time without discomfort.  We will discharge patient to home at this time.    ADDITIONAL PROBLEM LIST  -Methamphetamine abuse  -Cigarette smoking      DISPOSITION AND DISCUSSIONS  Escalation of care considered, and ultimately not performed: acute inpatient care management, however at this time, the patient is most appropriate for outpatient management.      DISPOSITION:  Patient will be discharged home in stable condition.    FOLLOW UP:  [facility] ([facility]) - Primary Care and Family Medicine  [address] [place] [address]  [facility] [address]  [phone]  Schedule an appointment as soon as possible for a visit         OUTPATIENT MEDICATIONS:  There are no discharge medications for this patient.    FINAL DIANGOSIS  1. Foreign body in esophagus, initial encounter Acute       Mitesh Monaco MD  [facility] Family [facility] Resident, PGY-3    The note accurately reflects work and decisions made by me.  Mary Huitron D.O.  10/30/2023  3:38 AM

## 2023-10-30 NOTE — DISCHARGE INSTRUCTIONS
Be sure to cut food into small pieces and chew thoroughly before swallowing in the future.  We recommend that you establish with a primary care physician at the next available appointment slot.  Return to ER for worsening symptoms including choking with vomiting, vomiting blood, or fever.

## 2023-10-30 NOTE — ED NOTES
Assumed care of patient, patient bedside report received from OLIVERIO Gordillo . Pt AAO X 4 , respirations even and unlabored, on room air . .

## 2023-10-30 NOTE — ED TRIAGE NOTES
.Narciso Wilson  .  Chief Complaint   Patient presents with    Other     Patient reports was eating steak about 1740 tonight and feels like it's now stuck in his throat.      Patient PRASHANT BROWNE from [facility] with above complaint. Patient speaking in clear full sentences. C/O throat pain. Maintaining airway.     PIV placed.     ERP to see.